# Patient Record
Sex: FEMALE | Race: BLACK OR AFRICAN AMERICAN | Employment: OTHER | URBAN - METROPOLITAN AREA
[De-identification: names, ages, dates, MRNs, and addresses within clinical notes are randomized per-mention and may not be internally consistent; named-entity substitution may affect disease eponyms.]

---

## 2017-01-16 ENCOUNTER — OFFICE VISIT (OUTPATIENT)
Dept: INTERNAL MEDICINE CLINIC | Age: 62
End: 2017-01-16

## 2017-01-16 VITALS
SYSTOLIC BLOOD PRESSURE: 109 MMHG | WEIGHT: 162.2 LBS | OXYGEN SATURATION: 99 % | HEART RATE: 83 BPM | BODY MASS INDEX: 27.69 KG/M2 | TEMPERATURE: 97.6 F | DIASTOLIC BLOOD PRESSURE: 84 MMHG | RESPIRATION RATE: 20 BRPM | HEIGHT: 64 IN

## 2017-01-16 DIAGNOSIS — I10 ESSENTIAL HYPERTENSION: ICD-10-CM

## 2017-01-16 DIAGNOSIS — Z23 ENCOUNTER FOR IMMUNIZATION: ICD-10-CM

## 2017-01-16 DIAGNOSIS — J45.20 ASTHMA, MILD INTERMITTENT, WELL-CONTROLLED: ICD-10-CM

## 2017-01-16 DIAGNOSIS — Z12.31 ENCOUNTER FOR SCREENING MAMMOGRAM FOR BREAST CANCER: ICD-10-CM

## 2017-01-16 DIAGNOSIS — J06.9 URTI (ACUTE UPPER RESPIRATORY INFECTION): Primary | ICD-10-CM

## 2017-01-16 DIAGNOSIS — E11.9 TYPE 2 DIABETES MELLITUS WITHOUT COMPLICATION, WITHOUT LONG-TERM CURRENT USE OF INSULIN (HCC): ICD-10-CM

## 2017-01-16 RX ORDER — AZITHROMYCIN 250 MG/1
TABLET, FILM COATED ORAL
Qty: 6 TAB | Refills: 0 | Status: SHIPPED | OUTPATIENT
Start: 2017-01-16 | End: 2017-05-17 | Stop reason: ALTCHOICE

## 2017-01-16 NOTE — LETTER
1/18/2017 10:10 AM 
 
Ms. Colette Santos [address] [phone] Dear Colette Santos: Please find your most recent results below. Resulted Orders METABOLIC PANEL, COMPREHENSIVE Result Value Ref Range Glucose 125 (H) 65 - 99 mg/dL BUN 14 8 - 27 mg/dL Creatinine 0.98 0.57 - 1.00 mg/dL GFR est non-AA 62 >59 mL/min/1.73 GFR est AA 71 >59 mL/min/1.73  
 BUN/Creatinine ratio 14 11 - 26 Sodium 142 134 - 144 mmol/L Potassium 4.2 3.5 - 5.2 mmol/L Chloride 99 96 - 106 mmol/L  
 CO2 26 18 - 29 mmol/L Calcium 9.6 8.7 - 10.3 mg/dL Protein, total 7.9 6.0 - 8.5 g/dL Albumin 4.6 3.6 - 4.8 g/dL GLOBULIN, TOTAL 3.3 1.5 - 4.5 g/dL A-G Ratio 1.4 1.1 - 2.5 Bilirubin, total 0.3 0.0 - 1.2 mg/dL Alk. phosphatase 48 39 - 117 IU/L  
 AST 19 0 - 40 IU/L  
 ALT 18 0 - 32 IU/L Narrative Performed at:  [address]  [id number] : Oscar Puente MD, Phone:  [phone] HEMOGLOBIN A1C WITH EAG Result Value Ref Range Hemoglobin A1c 6.8 (H) 4.8 - 5.6 % Comment:  
            Pre-diabetes: 5.7 - 6.4 Diabetes: >6.4 Glycemic control for adults with diabetes: <7.0 Estimated average glucose 148 mg/dL Narrative Performed at:  [address]  [id number] : Oscar Puente MD, Phone:  [phone] MICROALBUMIN, UR, RAND W/ MICROALBUMIN/CREA RATIO Result Value Ref Range Creatinine, urine 198.9 Not Estab. mg/dL Microalbumin, urine 28.2 Not Estab. ug/mL Microalb/Creat ratio (ug/mg creat.) 14.2 0.0 - 30.0 mg/g creat Narrative Performed at:  [facility] [address]  [id number] : Oscar Puente MD, Phone:  [phone] RECOMMENDATIONS: 
 
 
  Stable Please call me if you have any questions: [phone] Sincerely, Mehdi Mcfadden MD

## 2017-01-16 NOTE — MR AVS SNAPSHOT
Visit Information Date & Time Provider Department Dept. Phone Encounter #  
 1/16/2017  8:45 AM Gus Langston, [facility] [id number] Upcoming Health Maintenance Date Due Hepatitis C Screening 1955 Pneumococcal 19-64 Medium Risk (1 of 1 - PPSV23) 8/19/1973 DTaP/Tdap/Td series (1 - Tdap) 8/19/1975 ZOSTER VACCINE AGE 60> 8/19/2014 EYE EXAM RETINAL OR DILATED Q1 3/17/2016 FOOT EXAM Q1 6/23/2016 PAP AKA CERVICAL CYTOLOGY 6/26/2016 HEMOGLOBIN A1C Q6M 3/19/2017 MICROALBUMIN Q1 6/24/2017 LIPID PANEL Q1 6/24/2017 BREAST CANCER SCRN MAMMOGRAM 12/28/2017 COLONOSCOPY 1/3/2026 Allergies as of 1/16/2017  Review Complete On: 1/16/2017 By: Gus Langston MD  
  
 Severity Noted Reaction Type Reactions Seafood High 09/19/2016    Anaphylaxis Current Immunizations  Reviewed on 1/16/2017 No immunizations on file. Reviewed by Gus Langston MD on 1/16/2017 at  9:05 AM  
You Were Diagnosed With   
  
 Codes Comments URTI (acute upper respiratory infection)    -  Primary ICD-10-CM: J06.9 ICD-9-CM: 465.9 Asthma, mild intermittent, well-controlled     ICD-10-CM: J45.20 ICD-9-CM: 493.90 Type 2 diabetes mellitus without complication, without long-term current use of insulin (HCC)     ICD-10-CM: E11.9 ICD-9-CM: 250.00 Essential hypertension     ICD-10-CM: I10 
ICD-9-CM: 401.9 Encounter for screening mammogram for breast cancer     ICD-10-CM: Z12.31 
ICD-9-CM: V76.12 Encounter for immunization     ICD-10-CM: M93 ICD-9-CM: V03.89 Vitals BP Pulse Temp Resp Height(growth percentile) Weight(growth percentile) 109/84 (BP 1 Location: Right arm, BP Patient Position: Sitting) 83 97.6 °F (36.4 °C) (Oral) 20 5' 4\" (1.626 m) 162 lb 3.2 oz (73.6 kg) SpO2 BMI OB Status Smoking Status 99% 27.84 kg/m2 Postmenopausal Former Smoker Vitals History BMI and BSA Data Body Mass Index Body Surface Area  
 27.84 kg/m 2 1.82 m 2 Preferred Pharmacy Pharmacy Name Phone [facility] [address]  [facility] [phone] Your Updated Medication List  
  
   
This list is accurate as of: 1/16/17  9:11 AM.  Always use your most recent med list.  
  
  
  
  
 * albuterol 90 mcg/actuation inhaler Commonly known as:  PROAIR HFA INHALE 2 PUFFS ORALLY EVERY SIX HOURS AS NEEDED FOR WHEEZING  
  
 * albuterol 2.5 mg /3 mL (0.083 %) nebulizer solution Commonly known as:  PROVENTIL VENTOLIN  
3 mL by Nebulization route three (3) times daily as needed for Wheezing. azithromycin 250 mg tablet Commonly known as:  Abigail Livingston Take two tablets today then one tablet daily Blood-Glucose Meter monitoring kit Check sugar QD  
  
 fluticasone-salmeterol 250-50 mcg/dose diskus inhaler Commonly known as:  ADVAIR DISKUS Take 1 Puff by inhalation two (2) times a day. glucose blood VI test strips strip Commonly known as:  ONETOUCH ULTRA TEST Check BS QD Lancets Misc Check sugar QD  
  
 lisinopril-hydroCHLOROthiazide 20-25 mg per tablet Commonly known as:  PRINZIDE, ZESTORETIC  
TAKE 1 TABLET BY MOUTH EVERY DAY  
  
 metFORMIN 500 mg tablet Commonly known as:  GLUCOPHAGE  
TAKE 1 TABLET BY MOUTH TWICE DAILY WITH MEALS  
  
 mometasone 0.1 % ointment Commonly known as:  Estelle Croon Apply  to affected area daily. montelukast 10 mg tablet Commonly known as:  SINGULAIR Take 1 Tab by mouth daily. Nebulizer & Compressor machine Use TID PRN for wheezing Nebulizer Accessories Kit  
by Does Not Apply route. USE PRN WHEEZING  
  
 pneumococcal 13 yolande conj dip 0.5 mL Syrg injection Commonly known as:  PREVNAR-13  
0.5 mL by IntraMUSCular route once for 1 dose. * Notice:   This list has 2 medication(s) that are the same as other medications prescribed for you. Read the directions carefully, and ask your doctor or other care provider to review them with you. Prescriptions Sent to Pharmacy Refills  
 pneumococcal 13 yolande conj dip (PREVNAR-13) 0.5 mL syrg injection 0 Si.5 mL by IntraMUSCular route once for 1 dose. Class: Normal  
 Pharmacy: [facility] [address] Ph #: [phone] Route: IntraMUSCular  
 azithromycin (ZITHROMAX Z-DEQUAN) 250 mg tablet 0 Sig: Take two tablets today then one tablet daily Class: Normal  
 Pharmacy: [facility] [address] Ph #: [phone] We Performed the Following HEMOGLOBIN A1C WITH EAG [02247 CPT(R)] METABOLIC PANEL, COMPREHENSIVE [08478 CPT(R)] MICROALBUMIN, UR, RAND W/ MICROALBUMIN/CREA RATIO [id number] CPT(R)] To-Do List   
 2017 Imaging:  [facility] MAMMO BI SCREENING INCL CAD Patient Instructions Upper Respiratory Infection (Cold): Care Instructions Your Care Instructions An upper respiratory infection, or URI, is an infection of the nose, sinuses, or throat. URIs are spread by coughs, sneezes, and direct contact. The common cold is the most frequent kind of URI. The flu and sinus infections are other kinds of URIs. Almost all URIs are caused by viruses. Antibiotics won't cure them. But you can treat most infections with home care. This may include drinking lots of fluids and taking over-the-counter pain medicine. You will probably feel better in 4 to 10 days. The doctor has checked you carefully, but problems can develop later. If you notice any problems or new symptoms, get medical treatment right away. Follow-up care is a key part of your treatment and safety.  Be sure to make and go to all appointments, and call your doctor if you are having problems. It's also a good idea to know your test results and keep a list of the medicines you take. How can you care for yourself at home? · To prevent dehydration, drink plenty of fluids, enough so that your urine is light yellow or clear like water. Choose water and other caffeine-free clear liquids until you feel better. If you have kidney, heart, or liver disease and have to limit fluids, talk with your doctor before you increase the amount of fluids you drink. · Take an over-the-counter pain medicine, such as acetaminophen (Tylenol), ibuprofen (Advil, Motrin), or naproxen (Aleve). Read and follow all instructions on the label. · Before you use cough and cold medicines, check the label. These medicines may not be safe for young children or for people with certain health problems. · Be careful when taking over-the-counter cold or flu medicines and Tylenol at the same time. Many of these medicines have acetaminophen, which is Tylenol. Read the labels to make sure that you are not taking more than the recommended dose. Too much acetaminophen (Tylenol) can be harmful. · Get plenty of rest. 
· Do not smoke or allow others to smoke around you. If you need help quitting, talk to your doctor about stop-smoking programs and medicines. These can increase your chances of quitting for good. When should you call for help? Call 911 anytime you think you may need emergency care. For example, call if: 
· You have severe trouble breathing. Call your doctor now or seek immediate medical care if: 
· You seem to be getting much sicker. · You have new or worse trouble breathing. · You have a new or higher fever. · You have a new rash. Watch closely for changes in your health, and be sure to contact your doctor if: 
· You have a new symptom, such as a sore throat, an earache, or sinus pain. · You cough more deeply or more often, especially if you notice more mucus or a change in the color of your mucus. · You do not get better as expected. Where can you learn more? Go to http://sol-hardik.info/. Enter B267 in the search box to learn more about \"Upper Respiratory Infection (Cold): Care Instructions. \" Current as of: June 30, 2016 Content Version: 11.1 © 4587-2087 [employer]. Care instructions adapted under license by [employer] (which disclaims liability or warranty for this information). If you have questions about a medical condition or this instruction, always ask your healthcare professional. [address] any warranty or liability for your use of this information. Introducing [facility]! Dear Yaz Griffin: Thank you for requesting a [employer] account. Our records indicate that you already have an active [employer] account. You can access your account anytime at [email or web address] Did you know that you can access your hospital and ER discharge instructions at any time in [employer]? You can also review all of your test results from your hospital stay or ER visit. Additional Information If you have questions, please visit the Frequently Asked Questions section of the [employer] website at [email or web address] Remember, [employer] is NOT to be used for urgent needs. For medical emergencies, dial 911. Now available from your iPhone and Android! Please provide this summary of care documentation to your next provider. Your primary care clinician is listed as Dunia Crocker. If you have any questions after today's visit, please call [phone].

## 2017-01-16 NOTE — PROGRESS NOTES
Health Maintenance Due   Topic Date Due    Hepatitis C Screening  1955    Pneumococcal 19-64 Medium Risk (1 of 1 - PPSV23) 08/19/1973    DTaP/Tdap/Td series (1 - Tdap) 08/19/1975    ZOSTER VACCINE AGE 60>  08/19/2014    EYE EXAM RETINAL OR DILATED Q1  03/17/2016    FOOT EXAM Q1  06/23/2016    PAP AKA CERVICAL CYTOLOGY  06/26/2016       Chief Complaint   Patient presents with    Follow-up     4 month    Hypertension    Cholesterol Problem    Diabetes    Cold Symptoms     cough and nasal congestion       1. Have you been to the ER, urgent care clinic since your last visit? Hospitalized since your last visit? No    2. Have you seen or consulted any other health care providers outside of the [address] [place] [address] since your last visit? Include any pap smears or colon screening. No    3) Do you have an Advance Directive on file? no    4) Are you interested in receiving information on Advance Directives? NO      Patient is accompanied by self I have received verbal consent from Jagjit Baldwin to discuss any/all medical information while they are present in the room.

## 2017-01-16 NOTE — PATIENT INSTRUCTIONS

## 2017-01-16 NOTE — PROGRESS NOTES
HISTORY OF PRESENT ILLNESS  Alba Bateman is a 57 y.o. female here for follow up. She has been coughing for past 1 week.have yellow sputum. no wheezing. has asthma.using inhaler. She is seen for diabetes. He reports medication compliance: compliant most of the time. Diabetic diet compliance: compliant most of the time. Home glucose monitoring: is not performed. Further diabetic ROS: no polyuria or polydipsia, no chest pain, dyspnea or TIA's, no numbness, tingling or pain in extremities, no hypoglycemia. Lab review: labs reviewed and discussed with patient. Eye exam: up to date. Labs reviewed. Have HTN,on meds. Need mammogram and pneumonia shot. Follow-up   Pertinent negatives include no chest pain. Hypertension    Pertinent negatives include no chest pain, no palpitations, no blurred vision, no neck pain and no nausea. Cholesterol Problem   Pertinent negatives include no chest pain. Diabetes   Pertinent negatives include no chest pain. Cold Symptoms   Pertinent negatives include no chest pain, no chills, no myalgias and no nausea. Asthma   Pertinent negatives include no chest pain. Review of Systems   Constitutional: Negative. Negative for chills and fever. HENT: Negative. Eyes: Negative. Negative for blurred vision and double vision. Respiratory: Positive for cough and sputum production. Cardiovascular: Negative. Negative for chest pain and palpitations. Gastrointestinal: Negative. Negative for heartburn and nausea. Genitourinary: Negative. Negative for dysuria and urgency. Musculoskeletal: Negative. Negative for back pain, myalgias and neck pain. Skin: Negative. Negative for itching and rash. Psychiatric/Behavioral: Negative. Physical Exam   Constitutional: She appears well-developed and well-nourished. No distress. HENT:   Head: Normocephalic and atraumatic.    Right Ear: External ear normal.   Left Ear: External ear normal.   Mouth/Throat: Oropharynx is clear and moist. No oropharyngeal exudate. Nasal turbinates:red inflamed,NT    Cobble stoning present. Neck: Normal range of motion. Neck supple. No JVD present. No thyromegaly present. Cardiovascular: Normal rate, regular rhythm, normal heart sounds and intact distal pulses. Pulmonary/Chest: Effort normal and breath sounds normal. No respiratory distress. She has no wheezes. Abdominal: Soft. Bowel sounds are normal. She exhibits no distension. There is no tenderness. Musculoskeletal: She exhibits no edema or tenderness. Psychiatric: She has a normal mood and affect. Her behavior is normal.   anxious       ASSESSMENT and PLAN  Alba was seen today for follow-up, hypertension, cholesterol problem, diabetes and cold symptoms. Diagnoses and all orders for this visit:    URTI (acute upper respiratory infection)    Rest and fluid. Will call in,  -     azithromycin (ZITHROMAX Z-DEQUAN) 250 mg tablet; Take two tablets today then one tablet daily    Asthma, mild intermittent, well-controlled    Type 2 diabetes mellitus without complication, without long-term current use of insulin ([facility])  -     METABOLIC PANEL, COMPREHENSIVE  -     HEMOGLOBIN A1C WITH EAG  -     MICROALBUMIN, UR, RAND W/ MICROALBUMIN/CREA RATIO    Essential hypertension  -     METABOLIC PANEL, COMPREHENSIVE    Encounter for screening mammogram for breast cancer  -     [facility] MAMMO BI SCREENING INCL CAD; Future    Encounter for immunization  -     pneumococcal 13 yolande conj dip (PREVNAR-13) 0.5 mL syrg injection; 0.5 mL by IntraMUSCular route once for 1 dose. Discussed expected course/resolution/complications of diagnosis in detail with patient. Medication risks/benefits/costs/interactions/alternatives discussed with patient. Pt was given an after visit summary which includes diagnoses, current medications & vitals. Pt expressed understanding with the diagnosis and plan.

## 2017-01-17 LAB
ALBUMIN SERPL-MCNC: 4.6 G/DL (ref 3.6–4.8)
ALBUMIN/CREAT UR: 14.2 MG/G CREAT (ref 0–30)
ALBUMIN/GLOB SERPL: 1.4 {RATIO} (ref 1.1–2.5)
ALP SERPL-CCNC: 48 IU/L (ref 39–117)
ALT SERPL-CCNC: 18 IU/L (ref 0–32)
AST SERPL-CCNC: 19 IU/L (ref 0–40)
BILIRUB SERPL-MCNC: 0.3 MG/DL (ref 0–1.2)
BUN SERPL-MCNC: 14 MG/DL (ref 8–27)
BUN/CREAT SERPL: 14 (ref 11–26)
CALCIUM SERPL-MCNC: 9.6 MG/DL (ref 8.7–10.3)
CHLORIDE SERPL-SCNC: 99 MMOL/L (ref 96–106)
CO2 SERPL-SCNC: 26 MMOL/L (ref 18–29)
CREAT SERPL-MCNC: 0.98 MG/DL (ref 0.57–1)
CREAT UR-MCNC: 198.9 MG/DL
EST. AVERAGE GLUCOSE BLD GHB EST-MCNC: 148 MG/DL
GLOBULIN SER CALC-MCNC: 3.3 G/DL (ref 1.5–4.5)
GLUCOSE SERPL-MCNC: 125 MG/DL (ref 65–99)
HBA1C MFR BLD: 6.8 % (ref 4.8–5.6)
MICROALBUMIN UR-MCNC: 28.2 UG/ML
POTASSIUM SERPL-SCNC: 4.2 MMOL/L (ref 3.5–5.2)
PROT SERPL-MCNC: 7.9 G/DL (ref 6–8.5)
SODIUM SERPL-SCNC: 142 MMOL/L (ref 134–144)

## 2017-02-03 NOTE — TELEPHONE ENCOUNTER
Wants to have a refill for one touch ultra test strips. Has not had the script since 2014 that was last fill.  Please niya to [facility] on file

## 2017-05-17 ENCOUNTER — OFFICE VISIT (OUTPATIENT)
Dept: INTERNAL MEDICINE CLINIC | Age: 62
End: 2017-05-17

## 2017-05-17 VITALS
TEMPERATURE: 98 F | HEIGHT: 64 IN | OXYGEN SATURATION: 97 % | DIASTOLIC BLOOD PRESSURE: 80 MMHG | SYSTOLIC BLOOD PRESSURE: 140 MMHG | WEIGHT: 170 LBS | BODY MASS INDEX: 29.02 KG/M2 | RESPIRATION RATE: 20 BRPM | HEART RATE: 92 BPM

## 2017-05-17 DIAGNOSIS — E78.2 MIXED HYPERLIPIDEMIA: ICD-10-CM

## 2017-05-17 DIAGNOSIS — I10 ESSENTIAL HYPERTENSION: ICD-10-CM

## 2017-05-17 DIAGNOSIS — J45.20 ASTHMA, MILD INTERMITTENT, WELL-CONTROLLED: ICD-10-CM

## 2017-05-17 DIAGNOSIS — J30.1 ALLERGIC RHINITIS DUE TO POLLEN, UNSPECIFIED RHINITIS SEASONALITY: ICD-10-CM

## 2017-05-17 DIAGNOSIS — E11.9 TYPE 2 DIABETES MELLITUS WITHOUT COMPLICATION, WITHOUT LONG-TERM CURRENT USE OF INSULIN (HCC): Primary | ICD-10-CM

## 2017-05-17 NOTE — MR AVS SNAPSHOT
Visit Information Date & Time Provider Department Dept. Phone Encounter #  
 5/17/2017  8:45 AM Carmen Nguyen, [address] [facility] [phone] [id number] Upcoming Health Maintenance Date Due Hepatitis C Screening 1955 Pneumococcal 19-64 Medium Risk (1 of 1 - PPSV23) 8/19/1973 DTaP/Tdap/Td series (1 - Tdap) 8/19/1975 ZOSTER VACCINE AGE 60> 8/19/2014 EYE EXAM RETINAL OR DILATED Q1 3/17/2016 PAP AKA CERVICAL CYTOLOGY 6/26/2016 LIPID PANEL Q1 6/24/2017 HEMOGLOBIN A1C Q6M 7/16/2017 INFLUENZA AGE 9 TO ADULT 8/1/2017 BREAST CANCER SCRN MAMMOGRAM 12/28/2017 MICROALBUMIN Q1 1/16/2018 FOOT EXAM Q1 5/17/2018 COLONOSCOPY 1/3/2026 Allergies as of 5/17/2017  Review Complete On: 5/17/2017 By: Carmen Nguyen MD  
  
 Severity Noted Reaction Type Reactions Seafood High 09/19/2016    Anaphylaxis Current Immunizations  Reviewed on 1/16/2017 No immunizations on file. Not reviewed this visit You Were Diagnosed With   
  
 Codes Comments Type 2 diabetes mellitus without complication, without long-term current use of insulin (HCC)    -  Primary ICD-10-CM: E11.9 ICD-9-CM: 250.00 Essential hypertension     ICD-10-CM: I10 
ICD-9-CM: 401.9 Mixed hyperlipidemia     ICD-10-CM: E78.2 ICD-9-CM: 272.2 Asthma, mild intermittent, well-controlled     ICD-10-CM: J45.20 ICD-9-CM: 493.90 Allergic rhinitis due to pollen, unspecified rhinitis seasonality     ICD-10-CM: J30.1 ICD-9-CM: 477.0 Vitals BP Pulse Temp Resp Height(growth percentile) Weight(growth percentile) 140/80 92 98 °F (36.7 °C) (Oral) 20 5' 4\" (1.626 m) 170 lb (77.1 kg) SpO2 BMI OB Status Smoking Status 97% 29.18 kg/m2 Postmenopausal Former Smoker Vitals History BMI and BSA Data Body Mass Index Body Surface Area  
 29.18 kg/m 2 1.87 m 2 Preferred Pharmacy Pharmacy Name Phone [facility] [address]  [place] [phone] Your Updated Medication List  
  
   
This list is accurate as of: 5/17/17  9:18 AM.  Always use your most recent med list.  
  
  
  
  
 * albuterol 90 mcg/actuation inhaler Commonly known as:  PROAIR HFA INHALE 2 PUFFS ORALLY EVERY SIX HOURS AS NEEDED FOR WHEEZING  
  
 * albuterol 2.5 mg /3 mL (0.083 %) nebulizer solution Commonly known as:  PROVENTIL VENTOLIN  
3 mL by Nebulization route three (3) times daily as needed for Wheezing. Blood-Glucose Meter monitoring kit Check sugar QD  
  
 fluticasone-salmeterol 250-50 mcg/dose diskus inhaler Commonly known as:  ADVAIR DISKUS Take 1 Puff by inhalation two (2) times a day. * glucose blood VI test strips strip Commonly known as:  ONETOUCH ULTRA TEST Check BS QD  
  
 * glucose blood VI test strips strip Commonly known as:  ASCENSIA AUTODISC VI, ONE TOUCH ULTRA TEST VI To test blood glucose daily Lancets Misc Check sugar QD  
  
 lisinopril-hydroCHLOROthiazide 20-25 mg per tablet Commonly known as:  PRINZIDE, ZESTORETIC  
TAKE 1 TABLET BY MOUTH EVERY DAY  
  
 metFORMIN 500 mg tablet Commonly known as:  GLUCOPHAGE  
TAKE 1 TABLET BY MOUTH TWICE DAILY WITH MEALS  
  
 mometasone 0.1 % ointment Commonly known as:  Saddie Satchel Apply  to affected area daily. montelukast 10 mg tablet Commonly known as:  SINGULAIR Take 1 Tab by mouth daily. Nebulizer & Compressor machine Use TID PRN for wheezing Nebulizer Accessories Kit  
by Does Not Apply route. USE PRN WHEEZING  
  
 * Notice: This list has 4 medication(s) that are the same as other medications prescribed for you. Read the directions carefully, and ask your doctor or other care provider to review them with you. We Performed the Following ALLERGEN (24) PANEL [[id number] Custom] FOOD ALLERGY PROFILE [53941 CPT(R)] HEMOGLOBIN A1C WITH EAG [11513 CPT(R)] LIPID PANEL [90590 CPT(R)] METABOLIC PANEL, COMPREHENSIVE [41173 CPT(R)] Patient Instructions Learning About Diabetes Food Guidelines Your Care Instructions Meal planning is important to manage diabetes. It helps keep your blood sugar at a target level (which you set with your doctor). You don't have to eat special foods. You can eat what your family eats, including sweets once in a while. But you do have to pay attention to how often you eat and how much you eat of certain foods. You may want to work with a dietitian or a certified diabetes educator (CDE) to help you plan meals and snacks. A dietitian or CDE can also help you lose weight if that is one of your goals. What should you know about eating carbs? Managing the amount of carbohydrate (carbs) you eat is an important part of healthy meals when you have diabetes. Carbohydrate is found in many foods. · Learn which foods have carbs. And learn the amounts of carbs in different foods. ¨ Bread, cereal, pasta, and rice have about 15 grams of carbs in a serving. A serving is 1 slice of bread (1 ounce), ½ cup of cooked cereal, or 1/3 cup of cooked pasta or rice. ¨ Fruits have 15 grams of carbs in a serving. A serving is 1 small fresh fruit, such as an apple or orange; ½ of a banana; ½ cup of cooked or canned fruit; ½ cup of fruit juice; 1 cup of melon or raspberries; or 2 tablespoons of dried fruit. ¨ Milk and no-sugar-added yogurt have 15 grams of carbs in a serving. A serving is 1 cup of milk or 2/3 cup of no-sugar-added yogurt. ¨ Starchy vegetables have 15 grams of carbs in a serving. A serving is ½ cup of mashed potatoes or sweet potato; 1 cup winter squash; ½ of a small baked potato; ½ cup of cooked beans; or ½ cup cooked corn or green peas.  
· Learn how much carbs to eat each day and at each meal. A dietitian or CDE can teach you how to keep track of the amount of carbs you eat. This is called carbohydrate counting. · If you are not sure how to count carbohydrate grams, use the Plate Method to plan meals. It is a good, quick way to make sure that you have a balanced meal. It also helps you spread carbs throughout the day. ¨ Divide your plate by types of foods. Put non-starchy vegetables on half the plate, meat or other protein food on one-quarter of the plate, and a grain or starchy vegetable in the final quarter of the plate. To this you can add a small piece of fruit and 1 cup of milk or yogurt, depending on how many carbs you are supposed to eat at a meal. 
· Try to eat about the same amount of carbs at each meal. Do not \"save up\" your daily allowance of carbs to eat at one meal. 
· Proteins have very little or no carbs per serving. Examples of proteins are beef, chicken, turkey, fish, eggs, tofu, cheese, cottage cheese, and peanut butter. A serving size of meat is 3 ounces, which is about the size of a deck of cards. Examples of meat substitute serving sizes (equal to 1 ounce of meat) are 1/4 cup of cottage cheese, 1 egg, 1 tablespoon of peanut butter, and ½ cup of tofu. How can you eat out and still eat healthy? · Learn to estimate the serving sizes of foods that have carbohydrate. If you measure food at home, it will be easier to estimate the amount in a serving of restaurant food. · If the meal you order has too much carbohydrate (such as potatoes, corn, or baked beans), ask to have a low-carbohydrate food instead. Ask for a salad or green vegetables. · If you use insulin, check your blood sugar before and after eating out to help you plan how much to eat in the future. · If you eat more carbohydrate at a meal than you had planned, take a walk or do other exercise. This will help lower your blood sugar. What else should you know? · Limit saturated fat, such as the fat from meat and dairy products.  This is a healthy choice because people who have diabetes are at higher risk of heart disease. So choose lean cuts of meat and nonfat or low-fat dairy products. Use olive or canola oil instead of butter or shortening when cooking. · Don't skip meals. Your blood sugar may drop too low if you skip meals and take insulin or certain medicines for diabetes. · Check with your doctor before you drink alcohol. Alcohol can cause your blood sugar to drop too low. Alcohol can also cause a bad reaction if you take certain diabetes medicines. Follow-up care is a key part of your treatment and safety. Be sure to make and go to all appointments, and call your doctor if you are having problems. It's also a good idea to know your test results and keep a list of the medicines you take. Where can you learn more? Go to http://sol-hardik.info/. Enter I083 in the search box to learn more about \"Learning About Diabetes Food Guidelines. \" Current as of: May 23, 2016 Content Version: 11.2 © 0102-4178 [facility]. Care instructions adapted under license by [employer] (which disclaims liability or warranty for this information). If you have questions about a medical condition or this instruction, always ask your healthcare professional. [address] any warranty or liability for your use of this information. Introducing [facility]! Dear Levi Lechuga: Thank you for requesting a Alai account. Our records indicate that you already have an active Alai account. You can access your account anytime at [email or web address] Did you know that you can access your hospital and ER discharge instructions at any time in Alai? You can also review all of your test results from your hospital stay or ER visit. Additional Information If you have questions, please visit the Frequently Asked Questions section of the [employer] website at [email or web address] Remember, BarEye is NOT to be used for urgent needs. For medical emergencies, dial 911. Now available from your iPhone and Android! Please provide this summary of care documentation to your next provider. Your primary care clinician is listed as [facility]. If you have any questions after today's visit, please call [phone].

## 2017-05-17 NOTE — LETTER
6/1/2017 2:52 PM 
 
Ms. Mitzi Castro [address] [id number] Dear Mitzi Castro: Please find your most recent results below. Resulted Orders METABOLIC PANEL, COMPREHENSIVE Result Value Ref Range Glucose 168 (H) 65 - 99 mg/dL BUN 13 8 - 27 mg/dL Creatinine 0.94 0.57 - 1.00 mg/dL GFR est non-AA 65 >59 mL/min/1.73 GFR est AA 75 >59 mL/min/1.73  
 BUN/Creatinine ratio 14 12 - 28 Sodium 142 134 - 144 mmol/L Potassium 4.7 3.5 - 5.2 mmol/L Chloride 100 96 - 106 mmol/L  
 CO2 25 18 - 29 mmol/L Calcium 10.2 8.7 - 10.3 mg/dL Protein, total 7.6 6.0 - 8.5 g/dL Albumin 4.8 3.6 - 4.8 g/dL GLOBULIN, TOTAL 2.8 1.5 - 4.5 g/dL A-G Ratio 1.7 1.2 - 2.2 Bilirubin, total 0.4 0.0 - 1.2 mg/dL Alk. phosphatase 49 39 - 117 IU/L  
 AST (SGOT) 20 0 - 40 IU/L  
 ALT (SGPT) 21 0 - 32 IU/L Narrative Performed at:  [address]  [id number] : Sabrina Norris MD, Phone:  [phone] HEMOGLOBIN A1C WITH EAG Result Value Ref Range Hemoglobin A1c 7.0 (H) 4.8 - 5.6 % Comment:  
            Pre-diabetes: 5.7 - 6.4 Diabetes: >6.4 Glycemic control for adults with diabetes: <7.0 Estimated average glucose 154 mg/dL Narrative Performed at:  [facility] [address] [place] [address]  [id number] : Sabrina Norris MD, Phone:  [phone] LIPID PANEL Result Value Ref Range Cholesterol, total 199 100 - 199 mg/dL Triglyceride 110 0 - 149 mg/dL HDL Cholesterol 57 >39 mg/dL VLDL, calculated 22 5 - 40 mg/dL LDL, calculated 120 (H) 0 - 99 mg/dL Narrative Performed at:  [address] [address] [facility] [address] [place] [address]  [id number] : Sabrina Norris MD, Phone:  [phone] FOOD ALLERGY PROFILE Result Value Ref Range CLASS DESCRIPTION Comment Comment: Levels of Specific IgE       Class  Description of Class 
    ---------------------------  -----  -------------------- 
                   < 0.10         0         Negative 0.10 -    0.31         0/I       Equivocal/Low 
           0.32 -    0.55         I         Low 
           0.56 -    1.40         II        Moderate 1.41 -    3.90         III       High 
           3.91 -   19.00         IV        Very High 
          19.01 -  100.00         V         Very High 
                  >100.00         VI        Very High Egg White 0.17 (A) Class 0/I kU/L Peanut <0.10 Class 0 kU/L Soybean 1.39 (A) Class II kU/L Milk (Cow) 0.20 (A) Class 0/I kU/L Clam 2.29 (A) Class III kU/L Shrimp 8.06 (A) Class IV kU/L Walnuts, IgE <0.10 Class 0 kU/L Codfish <0.10 Class 0 kU/L Scallops 1.78 (A) Class III kU/L Wheat <0.10 Class 0 kU/L Corn <0.10 Class 0 kU/L Sesame Seed <0.10 Class 0 kU/L Narrative Performed at:  [address]  [id number] : Marisel Smith MD, Phone:  [phone] ALLERGEN (24) PANEL Result Value Ref Range CLASS DESCRIPTION Comment Comment:  
       Levels of Specific IgE       Class  Description of Class 
    ---------------------------  -----  -------------------- 
                   < 0.10         0         Negative 0.10 -    0.31         0/I       Equivocal/Low 
           0.32 -    0.55         I         Low 
           0.56 -    1.40         II        Moderate 1.41 -    3.90         III       High 
           3.91 -   19.00         IV        Very High 
          19.01 -  100.00         V         Very High 
                  >100.00         VI        Very High Immunoglobulin E 625 (H) 0 - 100 IU/mL D. pteronyssinus 33.60 (A) Class V kU/L  
 D. farinae Mite 26.80 (A) Class V kU/L  Cat Hair/Dander 95.20 (A) Class V kU/L  
 Dog Hair/Dander 42.60 (A) Class V kU/L Bermuda Grass 0.12 (A) Class 0/I kU/L Bravo grass 5.49 (A) Class IV kU/L Chapo Grass 0.84 (A) Class II kU/L Cockroach, Hebrew 2.52 (A) Class III kU/L Penicillium notatum <0.10 Class 0 kU/L Cladosporium herbarum <0.10 Class 0 kU/L Aspergillus fumigatus <0.10 Class 0 kU/L Alternaria tenuis <0.10 Class 0 kU/L Maple/Pawnee 0.24 (A) Class 0/I kU/L  
 U651-EVY COMMON SILVER BIRCH <0.10 Class 0 kU/L [facility] 0.76 (A) Class II kU/L [place] <0.10 Class 0 kU/L [employer] <0.10 Class 0 kU/L Cochran Tree <0.10 Class 0 kU/L Pecan Tree <0.10 Class 0 kU/L White [place] <0.10 Class 0 kU/L Ragweed, Short/Common 1.49 (A) Class III kU/L Pigweed, Rough <0.10 Class 0 kU/L Sheep [place] [facility]) 0.21 (A) Class 0/I kU/L Mouse urine 1.20 (A) Class II kU/L Narrative Performed at:  [address]  [id number] : Umesh Lerner MD, Phone:  [phone] CVD REPORT Result Value Ref Range INTERPRETATION Note Comment:  
   Supplement report is available. Narrative Performed at:  [facility] [place] [address]  [id number] : Rosaura Bey PhD, Phone:  [phone] DIABETES PATIENT EDUCATION Result Value Ref Range PDF Image Not applicable Narrative Performed at:  [address] [place] [address]  [id number] : Rosaura Bey PhD, Phone:  [phone] RECOMMENDATIONS: 
Myles Bernheim as per your conversation with my nurse, you have multiple allergies and please call Dr Areli Valles an Allergist, and make an appointment. All other labs are stable Please call me if you have any questions: [phone] Sincerely, Alcides Gandhi MD

## 2017-05-17 NOTE — PROGRESS NOTES
HISTORY OF PRESENT ILLNESS  [place] I Devra Schilder is a 57 y.o. female here for follow up. She is upset that she has gained weight. doign lot of weight lifting. watching diet. has asthma.using inhaler. Allergy is worse. lot of post nasal drip. She is seen for diabetes. He reports medication compliance: compliant most of the time. Diabetic diet compliance: compliant most of the time. Home glucose monitoring: is not performed. Further diabetic ROS: no polyuria or polydipsia, no chest pain, dyspnea or TIA's, no numbness, tingling or pain in extremities, no hypoglycemia. Lab review: labs reviewed and discussed with patient. Eye exam: up to date. Labs reviewed. Have HTN,on meds. Need mammogram and pneumonia shot. Hypertension    Pertinent negatives include no chest pain, no palpitations, no blurred vision, no neck pain and no nausea. Diabetes   Pertinent negatives include no chest pain. Cholesterol Problem   Pertinent negatives include no chest pain. Follow-up   Pertinent negatives include no chest pain. Cold Symptoms   Pertinent negatives include no chest pain, no chills, no myalgias and no nausea. Asthma   Pertinent negatives include no chest pain. Review of Systems   Constitutional: Negative. Negative for chills and fever. HENT: Positive for congestion. Eyes: Negative. Negative for blurred vision and double vision. Respiratory: Positive for cough. Cardiovascular: Negative. Negative for chest pain and palpitations. Gastrointestinal: Negative. Negative for heartburn and nausea. Genitourinary: Negative. Negative for dysuria and urgency. Musculoskeletal: Negative. Negative for back pain, myalgias and neck pain. Skin: Negative. Negative for itching and rash. Psychiatric/Behavioral: Negative. Physical Exam   Constitutional: She appears well-developed and well-nourished. No distress. HENT:   Head: Normocephalic and atraumatic.    Right Ear: External ear normal.   Left Ear: External ear normal.   Mouth/Throat: Oropharynx is clear and moist. No oropharyngeal exudate. Nasal turbinates:red inflamed,NT    Cobble stoning present. Neck: Normal range of motion. Neck supple. No JVD present. No thyromegaly present. Cardiovascular: Normal rate, regular rhythm, normal heart sounds and intact distal pulses. Pulmonary/Chest: Effort normal and breath sounds normal. No respiratory distress. She has no wheezes. Musculoskeletal: She exhibits no edema or tenderness. Diabetic foot exam:     Left: Reflexes 2+     Vibratory sensation normal    Proprioception normal   Sharp/dull discrimination normal    Filament test normal sensation with micro filament   Pulse DP: 2+ (normal)   Pulse PT: 2+ (normal)   Deformities: None  Right: Reflexes 2+   Vibratory sensation normal   Proprioception normal   Sharp/dull discrimination normal   Filament test normal sensation with micro filament   Pulse DP: 2+ (normal)   Pulse PT: 2+ (normal)   Deformities: None   Psychiatric: She has a normal mood and affect. Her behavior is normal.   anxious       ASSESSMENT and PLAN  Alba was seen today for hypertension, diabetes and cholesterol problem. Diagnoses and all orders for this visit:    Type 2 diabetes mellitus without complication, without long-term current use of insulin (HCC)    Stable. will do,  -     METABOLIC PANEL, COMPREHENSIVE  -     HEMOGLOBIN A1C WITH EAG    Essential hypertension    On med. Will do,  -     METABOLIC PANEL, COMPREHENSIVE    Mixed hyperlipidemia    Diet control. Will check,  -     METABOLIC PANEL, COMPREHENSIVE  -     LIPID PANEL    Asthma, mild intermittent, well-controlled    On inhaler. Allergic rhinitis due to pollen, unspecified rhinitis seasonality  -     ALLERGEN (24) PANEL  -     FOOD ALLERGY PROFILE  -     ALLERGEN (24) PANEL        Discussed expected course/resolution/complications of diagnosis in detail with patient.    Medication risks/benefits/costs/interactions/alternatives discussed with patient. Pt was given an after visit summary which includes diagnoses, current medications & vitals. Pt expressed understanding with the diagnosis and plan.

## 2017-05-17 NOTE — PROGRESS NOTES
Health Maintenance Due   Topic Date Due    Hepatitis C Screening  1955    Pneumococcal 19-64 Medium Risk (1 of 1 - PPSV23) 08/19/1973    DTaP/Tdap/Td series (1 - Tdap) 08/19/1975    ZOSTER VACCINE AGE 60>  08/19/2014    EYE EXAM RETINAL OR DILATED Q1  03/17/2016    FOOT EXAM Q1  06/23/2016    PAP AKA CERVICAL CYTOLOGY  06/26/2016       Chief Complaint   Patient presents with    Hypertension    Diabetes    Cholesterol Problem       1. Have you been to the ER, urgent care clinic since your last visit? Hospitalized since your last visit? No    2. Have you seen or consulted any other health care providers outside of the [address] [place] [address] since your last visit? Include any pap smears or colon screening. No    3) Do you have an Advance Directive on file? no    4) Are you interested in receiving information on Advance Directives? NO      Patient is accompanied by self I have received verbal consent from Radha Gautam to discuss any/all medical information while they are present in the room.

## 2017-05-17 NOTE — PATIENT INSTRUCTIONS

## 2017-05-20 LAB
A ALTERNATA IGE QN: <0.1 KU/L
A FUMIGATUS IGE QN: <0.1 KU/L
ALBUMIN SERPL-MCNC: 4.8 G/DL (ref 3.6–4.8)
ALBUMIN/GLOB SERPL: 1.7 {RATIO} (ref 1.2–2.2)
ALP SERPL-CCNC: 49 IU/L (ref 39–117)
ALT SERPL-CCNC: 21 IU/L (ref 0–32)
AST SERPL-CCNC: 20 IU/L (ref 0–40)
BERMUDA GRASS IGE QN: 0.12 KU/L
BILIRUB SERPL-MCNC: 0.4 MG/DL (ref 0–1.2)
BOXELDER IGE QN: 0.24 KU/L
BUN SERPL-MCNC: 13 MG/DL (ref 8–27)
BUN/CREAT SERPL: 14 (ref 12–28)
C HERBARUM IGE QN: <0.1 KU/L
CALCIUM SERPL-MCNC: 10.2 MG/DL (ref 8.7–10.3)
CAT DANDER IGE QN: 95.2 KU/L
CHLORIDE SERPL-SCNC: 100 MMOL/L (ref 96–106)
CHOLEST SERPL-MCNC: 199 MG/DL (ref 100–199)
CLAM IGE QN: 2.29 KU/L
CMN PIGWEED IGE QN: <0.1 KU/L
CO2 SERPL-SCNC: 25 MMOL/L (ref 18–29)
CODFISH IGE QN: <0.1 KU/L
COMMON RAGWEED IGE QN: 1.49 KU/L
CORN IGE QN: <0.1 KU/L
COTTONWOOD IGE QN: <0.1 KU/L
COW MILK IGE QN: 0.2 KU/L
CREAT SERPL-MCNC: 0.94 MG/DL (ref 0.57–1)
D FARINAE IGE QN: 26.8 KU/L
D PTERONYSS IGE QN: 33.6 KU/L
DOG DANDER IGE QN: 42.6 KU/L
EGG WHITE IGE QN: 0.17 KU/L
EST. AVERAGE GLUCOSE BLD GHB EST-MCNC: 154 MG/DL
GLOBULIN SER CALC-MCNC: 2.8 G/DL (ref 1.5–4.5)
GLUCOSE SERPL-MCNC: 168 MG/DL (ref 65–99)
HBA1C MFR BLD: 7 % (ref 4.8–5.6)
HDLC SERPL-MCNC: 57 MG/DL
IGE SERPL-ACNC: 625 IU/ML (ref 0–100)
INTERPRETATION, 910389: NORMAL
JOHNSON GRASS IGE QN: 0.84 KU/L
LDLC SERPL CALC-MCNC: 120 MG/DL (ref 0–99)
Lab: ABNORMAL
Lab: ABNORMAL
Lab: NORMAL
MOUSE URINE PROT IGE QN: 1.2 KU/L
MT JUNIPER IGE QN: 0.76 KU/L
P NOTATUM IGE QN: <0.1 KU/L
PEANUT IGE QN: <0.1 KU/L
PECAN/HICK TREE IGE QN: <0.1 KU/L
POTASSIUM SERPL-SCNC: 4.7 MMOL/L (ref 3.5–5.2)
PROT SERPL-MCNC: 7.6 G/DL (ref 6–8.5)
ROACH IGE QN: 2.52 KU/L
SCALLOP IGE QN: 1.78 KU/L
SESAME SEED IGE QN: <0.1 KU/L
SHEEP SORREL IGE QN: 0.21 KU/L
SHRIMP IGE QN: 8.06 KU/L
SILVER BIRCH IGE QN: <0.1 KU/L
SODIUM SERPL-SCNC: 142 MMOL/L (ref 134–144)
SOYBEAN IGE QN: 1.39 KU/L
TIMOTHY IGE QN: 5.49 KU/L
TRIGL SERPL-MCNC: 110 MG/DL (ref 0–149)
VLDLC SERPL CALC-MCNC: 22 MG/DL (ref 5–40)
WALNUT IGE QN: <0.1 KU/L
WHEAT IGE QN: <0.1 KU/L
WHITE ELM IGE QN: <0.1 KU/L
WHITE MULBERRY IGE QN: <0.1 KU/L
WHITE OAK IGE QN: <0.1 KU/L

## 2017-05-31 NOTE — PROGRESS NOTES
Lot of allergy. please send her a copy. refer her to allergist Jacqueline Kumar. still diabetic,watch carb.

## 2017-06-01 ENCOUNTER — TELEPHONE (OUTPATIENT)
Dept: INTERNAL MEDICINE CLINIC | Age: 62
End: 2017-06-01

## 2017-06-05 DIAGNOSIS — E11.9 DIABETES MELLITUS TYPE 2, CONTROLLED (HCC): ICD-10-CM

## 2017-06-05 RX ORDER — METFORMIN HYDROCHLORIDE 500 MG/1
TABLET ORAL
Qty: 180 TAB | Refills: 0 | Status: SHIPPED | OUTPATIENT
Start: 2017-06-05 | End: 2017-07-28 | Stop reason: SDUPTHER

## 2017-07-18 DIAGNOSIS — J45.20 ASTHMA, MILD INTERMITTENT, WELL-CONTROLLED: ICD-10-CM

## 2017-07-18 RX ORDER — LISINOPRIL AND HYDROCHLOROTHIAZIDE 20; 25 MG/1; MG/1
TABLET ORAL
Qty: 90 TAB | Refills: 1 | Status: SHIPPED | OUTPATIENT
Start: 2017-07-18 | End: 2017-08-04 | Stop reason: SDUPTHER

## 2017-07-18 RX ORDER — MONTELUKAST SODIUM 10 MG/1
TABLET ORAL
Qty: 90 TAB | Refills: 2 | Status: SHIPPED | OUTPATIENT
Start: 2017-07-18 | End: 2017-07-28 | Stop reason: SDUPTHER

## 2017-07-18 RX ORDER — ALBUTEROL SULFATE 90 UG/1
AEROSOL, METERED RESPIRATORY (INHALATION)
Qty: 24 INHALER | Refills: 1 | Status: SHIPPED | OUTPATIENT
Start: 2017-07-18 | End: 2017-07-28 | Stop reason: SDUPTHER

## 2017-07-28 DIAGNOSIS — J45.20 ASTHMA, MILD INTERMITTENT, WELL-CONTROLLED: ICD-10-CM

## 2017-07-28 DIAGNOSIS — E11.9 DIABETES MELLITUS TYPE 2, CONTROLLED (HCC): ICD-10-CM

## 2017-08-04 RX ORDER — MONTELUKAST SODIUM 10 MG/1
10 TABLET ORAL DAILY
Qty: 90 TAB | Refills: 2 | Status: SHIPPED | OUTPATIENT
Start: 2017-08-04 | End: 2017-09-18 | Stop reason: SDUPTHER

## 2017-08-04 RX ORDER — ALBUTEROL SULFATE 90 UG/1
2 AEROSOL, METERED RESPIRATORY (INHALATION)
Qty: 24 INHALER | Refills: 1 | Status: SHIPPED | OUTPATIENT
Start: 2017-08-04 | End: 2018-03-24 | Stop reason: SDUPTHER

## 2017-08-04 RX ORDER — METFORMIN HYDROCHLORIDE 500 MG/1
500 TABLET ORAL 2 TIMES DAILY WITH MEALS
Qty: 180 TAB | Refills: 0 | Status: SHIPPED | OUTPATIENT
Start: 2017-08-04 | End: 2017-12-04 | Stop reason: SDUPTHER

## 2017-08-04 RX ORDER — LISINOPRIL AND HYDROCHLOROTHIAZIDE 20; 25 MG/1; MG/1
TABLET ORAL
Qty: 90 TAB | Refills: 1 | Status: SHIPPED | OUTPATIENT
Start: 2017-08-04 | End: 2018-03-02 | Stop reason: SDUPTHER

## 2017-08-04 RX ORDER — MOMETASONE FUROATE 1 MG/G
OINTMENT TOPICAL DAILY
Qty: 30 G | Refills: 0 | Status: SHIPPED | OUTPATIENT
Start: 2017-08-04

## 2017-09-05 DIAGNOSIS — J45.20 ASTHMA, MILD INTERMITTENT, WELL-CONTROLLED: ICD-10-CM

## 2017-09-06 RX ORDER — ALBUTEROL SULFATE 90 UG/1
AEROSOL, METERED RESPIRATORY (INHALATION)
Qty: 1 INHALER | Refills: 1 | Status: SHIPPED | OUTPATIENT
Start: 2017-09-06 | End: 2018-12-06 | Stop reason: ALTCHOICE

## 2017-09-18 ENCOUNTER — OFFICE VISIT (OUTPATIENT)
Dept: INTERNAL MEDICINE CLINIC | Age: 62
End: 2017-09-18

## 2017-09-18 VITALS
WEIGHT: 176 LBS | BODY MASS INDEX: 30.05 KG/M2 | RESPIRATION RATE: 20 BRPM | SYSTOLIC BLOOD PRESSURE: 133 MMHG | HEIGHT: 64 IN | TEMPERATURE: 97.4 F | HEART RATE: 85 BPM | DIASTOLIC BLOOD PRESSURE: 84 MMHG | OXYGEN SATURATION: 97 %

## 2017-09-18 DIAGNOSIS — M70.51 BURSITIS OF OTHER BURSA OF RIGHT KNEE: ICD-10-CM

## 2017-09-18 DIAGNOSIS — E11.9 TYPE 2 DIABETES MELLITUS WITHOUT COMPLICATION, WITHOUT LONG-TERM CURRENT USE OF INSULIN (HCC): Primary | ICD-10-CM

## 2017-09-18 DIAGNOSIS — J45.20 ASTHMA, MILD INTERMITTENT, WELL-CONTROLLED: ICD-10-CM

## 2017-09-18 DIAGNOSIS — Z12.39 SCREENING BREAST EXAMINATION: ICD-10-CM

## 2017-09-18 DIAGNOSIS — I10 ESSENTIAL HYPERTENSION: ICD-10-CM

## 2017-09-18 DIAGNOSIS — E78.2 MIXED HYPERLIPIDEMIA: ICD-10-CM

## 2017-09-18 NOTE — PROGRESS NOTES
HISTORY OF PRESENT ILLNESS  Alba Sellers is a 60 y.o. female here for follow up.  has asthma.using inhaler. Allergy is worse. lot of post nasal drip. Not able to loose weight. doing regular exercise. She is seen for diabetes. He reports medication compliance: compliant most of the time. Diabetic diet compliance: compliant most of the time. Home glucose monitoring: is not performed. Further diabetic ROS: no polyuria or polydipsia, no chest pain, dyspnea or TIA's, no numbness, tingling or pain in extremities, no hypoglycemia. Lab review: labs reviewed and discussed with patient. Eye exam: up to date. reports right knee pain. no fall or injury,. Have HTN,on meds. Need mammogram.  Hypertension    Pertinent negatives include no chest pain, no palpitations, no blurred vision, no neck pain and no nausea. Diabetes   Pertinent negatives include no chest pain. Cholesterol Problem   Pertinent negatives include no chest pain. Asthma   Pertinent negatives include no chest pain. Review of Systems   Constitutional: Negative. Negative for chills and fever. HENT: Negative. Eyes: Negative. Negative for blurred vision and double vision. Respiratory: Negative. Cardiovascular: Negative. Negative for chest pain and palpitations. Gastrointestinal: Negative. Negative for heartburn and nausea. Genitourinary: Negative. Negative for dysuria and urgency. Musculoskeletal: Positive for joint pain. Negative for back pain, myalgias and neck pain. Skin: Negative. Negative for itching and rash. Psychiatric/Behavioral: Negative. Physical Exam   Constitutional: She appears well-developed and well-nourished. No distress. HENT:   Head: Normocephalic and atraumatic. Right Ear: External ear normal.   Left Ear: External ear normal.   Mouth/Throat: Oropharynx is clear and moist. No oropharyngeal exudate. Nasal turbinates:red inflamed,NT    Cobble stoning present. Neck: Normal range of motion. Neck supple. No JVD present. No thyromegaly present. Cardiovascular: Normal rate, regular rhythm, normal heart sounds and intact distal pulses. Pulmonary/Chest: Effort normal and breath sounds normal. No respiratory distress. She has no wheezes. Musculoskeletal: She exhibits no edema or tenderness. Right knee:NT ROm OK. slightly swollen. Psychiatric: She has a normal mood and affect. Her behavior is normal.   anxious       ASSESSMENT and PLAN    Diagnoses and all orders for this visit:    1. Type 2 diabetes mellitus without complication, without long-term current use of insulin (HCC)    Stable. will do,  -     METABOLIC PANEL, COMPREHENSIVE  -     HEMOGLOBIN A1C WITH EAG  -     MICROALBUMIN, UR, RAND    2. Essential hypertension    Stable. will check,  -     METABOLIC PANEL, COMPREHENSIVE    3. Mixed hyperlipidemia  Stable. 4. Asthma, mild intermittent, well-controlled  On inahler. 5. Bursitis of other bursa of right knee    advil PRN,ice pack. Need to use knee brace. -     Leg Brace (ACE KNEE BRACE) misc; by Does Not Apply route. Use on right knee QD    6. Screening breast examination  -     [facility] MAMMO BI SCREENING INCL CAD; Future    Discussed expected course/resolution/complications of diagnosis in detail with patient. Medication risks/benefits/costs/interactions/alternatives discussed with patient. Pt was given an after visit summary which includes diagnoses, current medications & vitals. Pt expressed understanding with the diagnosis and plan.

## 2017-09-18 NOTE — MR AVS SNAPSHOT
Visit Information Date & Time Provider Department Dept. Phone Encounter #  
 9/18/2017  8:30 AM Leida Serrano, [address] [facility] Internal Medicine [phone] [id number] Upcoming Health Maintenance Date Due Hepatitis C Screening 1955 Pneumococcal 19-64 Medium Risk (1 of 1 - PPSV23) 8/19/1973 DTaP/Tdap/Td series (1 - Tdap) 8/19/1975 ZOSTER VACCINE AGE 60> 6/19/2014 EYE EXAM RETINAL OR DILATED Q1 3/17/2016 PAP AKA CERVICAL CYTOLOGY 6/26/2016 INFLUENZA AGE 9 TO ADULT 8/1/2017 BREAST CANCER SCRN MAMMOGRAM 12/28/2017 HEMOGLOBIN A1C Q6M 11/17/2017 MICROALBUMIN Q1 1/16/2018 FOOT EXAM Q1 5/17/2018 LIPID PANEL Q1 5/17/2018 COLONOSCOPY 1/3/2026 Allergies as of 9/18/2017  Review Complete On: 9/18/2017 By: Leida Serrano MD  
  
 Severity Noted Reaction Type Reactions Seafood High 09/19/2016    Anaphylaxis Current Immunizations  Reviewed on 1/16/2017 No immunizations on file. Not reviewed this visit You Were Diagnosed With   
  
 Codes Comments Type 2 diabetes mellitus without complication, without long-term current use of insulin (HCC)    -  Primary ICD-10-CM: E11.9 ICD-9-CM: 250.00 Essential hypertension     ICD-10-CM: I10 
ICD-9-CM: 401.9 Mixed hyperlipidemia     ICD-10-CM: E78.2 ICD-9-CM: 272.2 Asthma, mild intermittent, well-controlled     ICD-10-CM: J45.20 ICD-9-CM: 493.90 Bursitis of other bursa of right knee     ICD-10-CM: M70.51 Vitals BP Pulse Temp Resp Height(growth percentile) Weight(growth percentile) 133/84 (BP 1 Location: Left arm, BP Patient Position: Sitting) 85 97.4 °F (36.3 °C) (Oral) 20 5' 4\" (1.626 m) 176 lb (79.8 kg) SpO2 BMI OB Status Smoking Status 97% 30.21 kg/m2 Postmenopausal Former Smoker Vitals History BMI and BSA Data Body Mass Index Body Surface Area  
 30.21 kg/m 2 1.9 m 2 Preferred Pharmacy Pharmacy Name Phone [facility] [address]  [place] [phone] Your Updated Medication List  
  
   
This list is accurate as of: 9/18/17  9:07 AM.  Always use your most recent med list.  
  
  
  
  
 * albuterol 2.5 mg /3 mL (0.083 %) nebulizer solution Commonly known as:  PROVENTIL VENTOLIN  
3 mL by Nebulization route three (3) times daily as needed for Wheezing. * albuterol 90 mcg/actuation inhaler Commonly known as:  PROAIR HFA Take 2 Puffs by inhalation every four (4) hours as needed for Wheezing. * PROAIR HFA 90 mcg/actuation inhaler Generic drug:  albuterol INHALE 2 PUFFS BY MOUTH EVERY 6 HOURS AS NEEDED FOR WHEEZING Blood-Glucose Meter monitoring kit Check sugar QD  
  
 fluticasone-salmeterol 250-50 mcg/dose diskus inhaler Commonly known as:  ADVAIR DISKUS Take 1 Puff by inhalation two (2) times a day. * glucose blood VI test strips strip Commonly known as:  ONETOUCH ULTRA TEST Check BS QD  
  
 * glucose blood VI test strips strip Commonly known as:  ASCENSIA AUTODISC VI, ONE TOUCH ULTRA TEST VI To test blood glucose daily Lancets Misc Check sugar QD Leg Brace Misc Commonly known as:  ACE KNEE BRACE  
by Does Not Apply route. Use on right knee QD  
  
 lisinopril-hydroCHLOROthiazide 20-25 mg per tablet Commonly known as:  PRINZIDE, ZESTORETIC  
TAKE 1 TABLET BY MOUTH EVERY DAY  
  
 metFORMIN 500 mg tablet Commonly known as:  GLUCOPHAGE Take 1 Tab by mouth two (2) times daily (with meals). mometasone 0.1 % ointment Commonly known as:  lOive Isidro Apply  to affected area daily. montelukast 10 mg tablet Commonly known as:  SINGULAIR Take 1 Tab by mouth daily. Nebulizer & Compressor machine Use TID PRN for wheezing Nebulizer Accessories Kit  
by Does Not Apply route.  USE PRN WHEEZING  
  
 * Notice: This list has 5 medication(s) that are the same as other medications prescribed for you. Read the directions carefully, and ask your doctor or other care provider to review them with you. Prescriptions Printed Refills Leg Brace (ACE KNEE BRACE) misc 0 Sig: by Does Not Apply route. Use on right knee QD Class: Print Route: Does Not Apply We Performed the Following HEMOGLOBIN A1C WITH EAG [48589 CPT(R)] METABOLIC PANEL, COMPREHENSIVE [75342 CPT(R)] MICROALBUMIN, UR, RAND [id number] CPT(R)] Patient Instructions DASH Diet: Care Instructions Your Care Instructions The DASH diet is an eating plan that can help lower your blood pressure. DASH stands for Dietary Approaches to Stop Hypertension. Hypertension is high blood pressure. The DASH diet focuses on eating foods that are high in calcium, potassium, and magnesium. These nutrients can lower blood pressure. The foods that are highest in these nutrients are fruits, vegetables, low-fat dairy products, nuts, seeds, and legumes. But taking calcium, potassium, and magnesium supplements instead of eating foods that are high in those nutrients does not have the same effect. The DASH diet also includes whole grains, fish, and poultry. The DASH diet is one of several lifestyle changes your doctor may recommend to lower your high blood pressure. Your doctor may also want you to decrease the amount of sodium in your diet. Lowering sodium while following the DASH diet can lower blood pressure even further than just the DASH diet alone. Follow-up care is a key part of your treatment and safety. Be sure to make and go to all appointments, and call your doctor if you are having problems. It's also a good idea to know your test results and keep a list of the medicines you take. How can you care for yourself at home? Following the DASH diet · Eat 4 to 5 servings of fruit each day. A serving is 1 medium-sized piece of fruit, ½ cup chopped or canned fruit, 1/4 cup dried fruit, or 4 ounces (½ cup) of fruit juice. Choose fruit more often than fruit juice. · Eat 4 to 5 servings of vegetables each day. A serving is 1 cup of lettuce or raw leafy vegetables, ½ cup of chopped or cooked vegetables, or 4 ounces (½ cup) of vegetable juice. Choose vegetables more often than vegetable juice. · Get 2 to 3 servings of low-fat and fat-free dairy each day. A serving is 8 ounces of milk, 1 cup of yogurt, or 1 ½ ounces of cheese. · Eat 6 to 8 servings of grains each day. A serving is 1 slice of bread, 1 ounce of dry cereal, or ½ cup of cooked rice, pasta, or cooked cereal. Try to choose whole-grain products as much as possible. · Limit lean meat, poultry, and fish to 2 servings each day. A serving is 3 ounces, about the size of a deck of cards. · Eat 4 to 5 servings of nuts, seeds, and legumes (cooked dried beans, lentils, and split peas) each week. A serving is 1/3 cup of nuts, 2 tablespoons of seeds, or ½ cup of cooked beans or peas. · Limit fats and oils to 2 to 3 servings each day. A serving is 1 teaspoon of vegetable oil or 2 tablespoons of salad dressing. · Limit sweets and added sugars to 5 servings or less a week. A serving is 1 tablespoon jelly or jam, ½ cup sorbet, or 1 cup of lemonade. · Eat less than 2,300 milligrams (mg) of sodium a day. If you limit your sodium to 1,500 mg a day, you can lower your blood pressure even more. Tips for success · Start small. Do not try to make dramatic changes to your diet all at once. You might feel that you are missing out on your favorite foods and then be more likely to not follow the plan. Make small changes, and stick with them. Once those changes become habit, add a few more changes. · Try some of the following: ¨ Make it a goal to eat a fruit or vegetable at every meal and at snacks. This will make it easy to get the recommended amount of fruits and vegetables each day. ¨ Try yogurt topped with fruit and nuts for a snack or healthy dessert. ¨ Add lettuce, tomato, cucumber, and onion to sandwiches. ¨ Combine a ready-made pizza crust with low-fat mozzarella cheese and lots of vegetable toppings. Try using tomatoes, squash, spinach, broccoli, carrots, cauliflower, and onions. ¨ Have a variety of cut-up vegetables with a low-fat dip as an appetizer instead of chips and dip. ¨ Sprinkle sunflower seeds or chopped almonds over salads. Or try adding chopped walnuts or almonds to cooked vegetables. ¨ Try some vegetarian meals using beans and peas. Add garbanzo or kidney beans to salads. Make burritos and tacos with mashed nunes beans or black beans. Where can you learn more? Go to http://sol-hardik.info/. Enter S157 in the search box to learn more about \"DASH Diet: Care Instructions. \" Current as of: April 3, 2017 Content Version: 11.3 © 4166-8510 [employer]. Care instructions adapted under license by [employer] (which disclaims liability or warranty for this information). If you have questions about a medical condition or this instruction, always ask your healthcare professional. [place] [address] any warranty or liability for your use of this information. Introducing hospitals & HEALTH SERVICES! Dear Juan Upton: Thank you for requesting a [employer] account. Our records indicate that you already have an active [employer] account. You can access your account anytime at [email or web address] Did you know that you can access your hospital and ER discharge instructions at any time in [employer]? You can also review all of your test results from your hospital stay or ER visit. Additional Information If you have questions, please visit the Frequently Asked Questions section of the [employer] website at [email or web address] Remember, [employer] is NOT to be used for urgent needs. For medical emergencies, dial 911. Now available from your iPhone and Android! Please provide this summary of care documentation to your next provider. Your primary care clinician is listed as Reese Kelley. If you have any questions after today's visit, please call [phone].

## 2017-09-18 NOTE — PATIENT INSTRUCTIONS

## 2017-09-18 NOTE — PROGRESS NOTES
Health Maintenance Due   Topic Date Due    Hepatitis C Screening  1955    Pneumococcal 19-64 Medium Risk (1 of 1 - PPSV23) 08/19/1973    DTaP/Tdap/Td series (1 - Tdap) 08/19/1975    ZOSTER VACCINE AGE 60>  06/19/2014    EYE EXAM RETINAL OR DILATED Q1  03/17/2016    PAP AKA CERVICAL CYTOLOGY  06/26/2016    INFLUENZA AGE 9 TO ADULT  08/01/2017    BREAST CANCER SCRN MAMMOGRAM  12/28/2017       Chief Complaint   Patient presents with    Hypertension     4 month follow    Coronary Artery Disease    Diabetes       1. Have you been to the ER, urgent care clinic since your last visit? Hospitalized since your last visit? No    2. Have you seen or consulted any other health care providers outside of the [address] [place] [address] since your last visit? Include any pap smears or colon screening. No    3) Do you have an Advance Directive on file? no    4) Are you interested in receiving information on Advance Directives? NO      Patient is accompanied by self I have received verbal consent from Jackelin Jameson to discuss any/all medical information while they are present in the room.

## 2017-09-20 LAB
ALBUMIN SERPL-MCNC: 4.7 G/DL (ref 3.6–4.8)
ALBUMIN/GLOB SERPL: 1.4 {RATIO} (ref 1.2–2.2)
ALP SERPL-CCNC: 53 IU/L (ref 39–117)
ALT SERPL-CCNC: 18 IU/L (ref 0–32)
AST SERPL-CCNC: 20 IU/L (ref 0–40)
BILIRUB SERPL-MCNC: 0.5 MG/DL (ref 0–1.2)
BUN SERPL-MCNC: 14 MG/DL (ref 8–27)
BUN/CREAT SERPL: 13 (ref 12–28)
CALCIUM SERPL-MCNC: 9.6 MG/DL (ref 8.7–10.3)
CHLORIDE SERPL-SCNC: 100 MMOL/L (ref 96–106)
CO2 SERPL-SCNC: 26 MMOL/L (ref 18–29)
CREAT SERPL-MCNC: 1.05 MG/DL (ref 0.57–1)
EST. AVERAGE GLUCOSE BLD GHB EST-MCNC: 157 MG/DL
GLOBULIN SER CALC-MCNC: 3.3 G/DL (ref 1.5–4.5)
GLUCOSE SERPL-MCNC: 140 MG/DL (ref 65–99)
HBA1C MFR BLD: 7.1 % (ref 4.8–5.6)
INTERPRETATION: NORMAL
Lab: NORMAL
MICROALBUMIN UR-MCNC: 13.6 UG/ML
POTASSIUM SERPL-SCNC: 4.1 MMOL/L (ref 3.5–5.2)
PROT SERPL-MCNC: 8 G/DL (ref 6–8.5)
SODIUM SERPL-SCNC: 143 MMOL/L (ref 134–144)

## 2017-10-16 DIAGNOSIS — J45.909 ASTHMA, MODERATE: ICD-10-CM

## 2017-10-16 DIAGNOSIS — J45.20 ASTHMA, MILD INTERMITTENT, WELL-CONTROLLED: ICD-10-CM

## 2017-10-16 RX ORDER — MONTELUKAST SODIUM 10 MG/1
TABLET ORAL
Qty: 90 TAB | Refills: 0 | Status: SHIPPED | OUTPATIENT
Start: 2017-10-16 | End: 2018-01-03 | Stop reason: SDUPTHER

## 2017-10-24 DIAGNOSIS — J45.909 ASTHMA, MODERATE: ICD-10-CM

## 2017-10-24 DIAGNOSIS — J45.20 ASTHMA, MILD INTERMITTENT, WELL-CONTROLLED: ICD-10-CM

## 2017-10-25 RX ORDER — FLUTICASONE PROPIONATE AND SALMETEROL 50; 250 UG/1; UG/1
POWDER RESPIRATORY (INHALATION)
Qty: 1 INHALER | Refills: 5 | Status: SHIPPED | OUTPATIENT
Start: 2017-10-25 | End: 2018-07-23 | Stop reason: SDUPTHER

## 2017-11-30 ENCOUNTER — TELEPHONE (OUTPATIENT)
Dept: INTERNAL MEDICINE CLINIC | Age: 62
End: 2017-11-30

## 2017-11-30 DIAGNOSIS — R30.0 DYSURIA: Primary | ICD-10-CM

## 2017-11-30 RX ORDER — CIPROFLOXACIN 500 MG/1
500 TABLET ORAL 2 TIMES DAILY
Qty: 10 TAB | Refills: 0 | Status: SHIPPED | OUTPATIENT
Start: 2017-11-30 | End: 2017-12-05

## 2017-11-30 NOTE — PROGRESS NOTES
Requested Prescriptions     Signed Prescriptions Disp Refills    ciprofloxacin HCl (CIPRO) 500 mg tablet 10 Tab 0     Sig: Take 1 Tab by mouth two (2) times a day for 5 days. Kelly. Kit Lewis, verbal order received.

## 2017-11-30 NOTE — TELEPHONE ENCOUNTER
Pt called and advised that she needs a prescription sent to her phcy for a UTI. Says she she needs it as soon as possible but was not willing to make appt when I suggested.

## 2017-11-30 NOTE — TELEPHONE ENCOUNTER
Call,placed to pt and with sx of dysuria and frequemcy, verbal order from Dr Masood Pa for Cipro x 5 days

## 2017-12-04 DIAGNOSIS — E11.9 DIABETES MELLITUS TYPE 2, CONTROLLED (HCC): ICD-10-CM

## 2017-12-05 RX ORDER — METFORMIN HYDROCHLORIDE 500 MG/1
TABLET ORAL
Qty: 180 TAB | Refills: 0 | Status: SHIPPED | OUTPATIENT
Start: 2017-12-05 | End: 2017-12-15 | Stop reason: SDUPTHER

## 2017-12-14 ENCOUNTER — OFFICE VISIT (OUTPATIENT)
Dept: INTERNAL MEDICINE CLINIC | Age: 62
End: 2017-12-14

## 2017-12-14 VITALS
WEIGHT: 172 LBS | TEMPERATURE: 96.5 F | HEART RATE: 86 BPM | HEIGHT: 64 IN | DIASTOLIC BLOOD PRESSURE: 70 MMHG | OXYGEN SATURATION: 96 % | SYSTOLIC BLOOD PRESSURE: 118 MMHG | BODY MASS INDEX: 29.37 KG/M2 | RESPIRATION RATE: 16 BRPM

## 2017-12-14 DIAGNOSIS — E11.9 TYPE 2 DIABETES MELLITUS WITHOUT COMPLICATION, WITHOUT LONG-TERM CURRENT USE OF INSULIN (HCC): ICD-10-CM

## 2017-12-14 DIAGNOSIS — R30.9 PAINFUL URINATION: Primary | ICD-10-CM

## 2017-12-14 LAB
BACTERIA UA POCT, BACTPOCT: NORMAL
BILIRUB UR QL STRIP: NEGATIVE
CASTS UA POCT: NORMAL
CLUE CELLS, CLUEPOCT: NORMAL
CRYSTALS UA POCT, CRYSPOCT: NORMAL
EPITHELIAL CELLS POCT: NORMAL
GLUCOSE UR-MCNC: NEGATIVE MG/DL
KETONES P FAST UR STRIP-MCNC: NEGATIVE MG/DL
MUCUS UA POCT, MUCPOCT: NORMAL
PH UR STRIP: 5.5 [PH] (ref 4.6–8)
PROT UR QL STRIP: NEGATIVE
RBC UA POCT, RBCPOCT: NORMAL
SP GR UR STRIP: 1.02 (ref 1–1.03)
TRICH UA POCT, TRICHPOC: NORMAL
UA UROBILINOGEN AMB POC: NORMAL (ref 0.2–1)
URINALYSIS CLARITY POC: CLEAR
URINALYSIS COLOR POC: YELLOW
URINE BLOOD POC: NORMAL
URINE CULT COMMENT, POCT: NORMAL
URINE LEUKOCYTES POC: NEGATIVE
URINE NITRITES POC: NEGATIVE
WBC UA POCT, WBCPOCT: NORMAL
YEAST UA POCT, YEASTPOC: NORMAL

## 2017-12-14 NOTE — PATIENT INSTRUCTIONS
Learning About Diabetes Food Guidelines  Your Care Instructions    Meal planning is important to manage diabetes. It helps keep your blood sugar at a target level (which you set with your doctor). You don't have to eat special foods. You can eat what your family eats, including sweets once in a while. But you do have to pay attention to how often you eat and how much you eat of certain foods. You may want to work with a dietitian or a certified diabetes educator (CDE) to help you plan meals and snacks. A dietitian or CDE can also help you lose weight if that is one of your goals. What should you know about eating carbs? Managing the amount of carbohydrate (carbs) you eat is an important part of healthy meals when you have diabetes. Carbohydrate is found in many foods. · Learn which foods have carbs. And learn the amounts of carbs in different foods. ¨ Bread, cereal, pasta, and rice have about 15 grams of carbs in a serving. A serving is 1 slice of bread (1 ounce), ½ cup of cooked cereal, or 1/3 cup of cooked pasta or rice. ¨ Fruits have 15 grams of carbs in a serving. A serving is 1 small fresh fruit, such as an apple or orange; ½ of a banana; ½ cup of cooked or canned fruit; ½ cup of fruit juice; 1 cup of melon or raspberries; or 2 tablespoons of dried fruit. ¨ Milk and no-sugar-added yogurt have 15 grams of carbs in a serving. A serving is 1 cup of milk or 2/3 cup of no-sugar-added yogurt. ¨ Starchy vegetables have 15 grams of carbs in a serving. A serving is ½ cup of mashed potatoes or sweet potato; 1 cup winter squash; ½ of a small baked potato; ½ cup of cooked beans; or ½ cup cooked corn or green peas. · Learn how much carbs to eat each day and at each meal. A dietitian or CDE can teach you how to keep track of the amount of carbs you eat. This is called carbohydrate counting. · If you are not sure how to count carbohydrate grams, use the Plate Method to plan meals.  It is a good, quick way to make sure that you have a balanced meal. It also helps you spread carbs throughout the day. ¨ Divide your plate by types of foods. Put non-starchy vegetables on half the plate, meat or other protein food on one-quarter of the plate, and a grain or starchy vegetable in the final quarter of the plate. To this you can add a small piece of fruit and 1 cup of milk or yogurt, depending on how many carbs you are supposed to eat at a meal.  · Try to eat about the same amount of carbs at each meal. Do not \"save up\" your daily allowance of carbs to eat at one meal.  · Proteins have very little or no carbs per serving. Examples of proteins are beef, chicken, turkey, fish, eggs, tofu, cheese, cottage cheese, and peanut butter. A serving size of meat is 3 ounces, which is about the size of a deck of cards. Examples of meat substitute serving sizes (equal to 1 ounce of meat) are 1/4 cup of cottage cheese, 1 egg, 1 tablespoon of peanut butter, and ½ cup of tofu. How can you eat out and still eat healthy? · Learn to estimate the serving sizes of foods that have carbohydrate. If you measure food at home, it will be easier to estimate the amount in a serving of restaurant food. · If the meal you order has too much carbohydrate (such as potatoes, corn, or baked beans), ask to have a low-carbohydrate food instead. Ask for a salad or green vegetables. · If you use insulin, check your blood sugar before and after eating out to help you plan how much to eat in the future. · If you eat more carbohydrate at a meal than you had planned, take a walk or do other exercise. This will help lower your blood sugar. What else should you know? · Limit saturated fat, such as the fat from meat and dairy products. This is a healthy choice because people who have diabetes are at higher risk of heart disease. So choose lean cuts of meat and nonfat or low-fat dairy products.  Use olive or canola oil instead of butter or shortening when cooking. · Don't skip meals. Your blood sugar may drop too low if you skip meals and take insulin or certain medicines for diabetes. · Check with your doctor before you drink alcohol. Alcohol can cause your blood sugar to drop too low. Alcohol can also cause a bad reaction if you take certain diabetes medicines. Follow-up care is a key part of your treatment and safety. Be sure to make and go to all appointments, and call your doctor if you are having problems. It's also a good idea to know your test results and keep a list of the medicines you take. Where can you learn more? Go to http://sol-hardik.info/. Enter L822 in the search box to learn more about \"Learning About Diabetes Food Guidelines. \"  Current as of: March 13, 2017  Content Version: 11.4  © 5376-6809 [employer]. Care instructions adapted under license by [employer] (which disclaims liability or warranty for this information). If you have questions about a medical condition or this instruction, always ask your healthcare professional. [place] [address] any warranty or liability for your use of this information. Learning About Meal Planning for Diabetes  Why plan your meals? Meal planning can be a key part of managing diabetes. Planning meals and snacks with the right balance of carbohydrate, protein, and fat can help you keep your blood sugar at the target level you set with your doctor. You don't have to eat special foods. You can eat what your family eats, including sweets once in a while. But you do have to pay attention to how often you eat and how much you eat of certain foods. You may want to work with a dietitian or a certified diabetes educator. He or she can give you tips and meal ideas and can answer your questions about meal planning. This health professional can also help you reach a healthy weight if that is one of your goals. What plan is right for you?   Your dietitian or diabetes educator may suggest that you start with the plate format or carbohydrate counting. The plate format  The plate format is a simple way to help you manage how you eat. You plan meals by learning how much space each food should take on a plate. Using the plate format helps you spread carbohydrate throughout the day. It can make it easier to keep your blood sugar level within your target range. It also helps you see if you're eating healthy portion sizes. To use the plate format, you put non-starchy vegetables on half your plate. Add meat or meat substitutes on one-quarter of the plate. Put a grain or starchy vegetable (such as brown rice or a potato) on the final quarter of the plate. You can add a small piece of fruit and some low-fat or fat-free milk or yogurt, depending on your carbohydrate goal for each meal.  Here are some tips for using the plate format:  · Make sure that you are not using an oversized plate. A 9-inch plate is best. Many restaurants use larger plates. · Get used to using the plate format at home. Then you can use it when you eat out. · Write down your questions about using the plate format. Talk to your doctor, a dietitian, or a diabetes educator about your concerns. Carbohydrate counting  With carbohydrate counting, you plan meals based on the amount of carbohydrate in each food. Carbohydrate raises blood sugar higher and more quickly than any other nutrient. It is found in desserts, breads and cereals, and fruit. It's also found in starchy vegetables such as potatoes and corn, grains such as rice and pasta, and milk and yogurt. Spreading carbohydrate throughout the day helps keep your blood sugar levels within your target range. Your daily amount depends on several things, including your weight, how active you are, which diabetes medicines you take, and what your goals are for your blood sugar levels.  A registered dietitian or diabetes educator can help you plan how much carbohydrate to include in each meal and snack. A guideline for your daily amount of carbohydrate is:  · 45 to 60 grams at each meal. That's about the same as 3 to 4 carbohydrate servings. · 15 to 20 grams at each snack. That's about the same as 1 carbohydrate serving. The Nutrition Facts label on packaged foods tells you how much carbohydrate is in a serving of the food. First, look at the serving size on the food label. Is that the amount you eat in a serving? All of the nutrition information on a food label is based on that serving size. So if you eat more or less than that, you'll need to adjust the other numbers. Total carbohydrate is the next thing you need to look for on the label. If you count carbohydrate servings, one serving of carbohydrate is 15 grams. For foods that don't come with labels, such as fresh fruits and vegetables, you'll need a guide that lists carbohydrate in these foods. Ask your doctor, dietitian, or diabetes educator about books or other nutrition guides you can use. If you take insulin, you need to know how many grams of carbohydrate are in a meal. This lets you know how much rapid-acting insulin to take before you eat. If you use an insulin pump, you get a constant rate of insulin during the day. So the pump must be programmed at meals to give you extra insulin to cover the rise in blood sugar after meals. When you know how much carbohydrate you will eat, you can take the right amount of insulin. Or, if you always use the same amount of insulin, you need to make sure that you eat the same amount of carbohydrate at meals. If you need more help to understand carbohydrate counting and food labels, ask your doctor, dietitian, or diabetes educator. How do you get started with meal planning? Here are some tips to get started:  · Plan your meals a week at a time. Don't forget to include snacks too. · Use cookbooks or online recipes to plan several main meals.  Plan some quick meals for busy nights. You also can double some recipes that freeze well. Then you can save half for other busy nights when you don't have time to cook. · Make sure you have the ingredients you need for your recipes. If you're running low on basic items, put these items on your shopping list too. · List foods that you use to make breakfasts, lunches, and snacks. List plenty of fruits and vegetables. · Post this list on the refrigerator. Add to it as you think of more things you need. · Take the list to the store to do your weekly shopping. Follow-up care is a key part of your treatment and safety. Be sure to make and go to all appointments, and call your doctor if you are having problems. It's also a good idea to know your test results and keep a list of the medicines you take. Where can you learn more? Go to http://sol-hardik.info/. Shelton Mack in the search box to learn more about \"Learning About Meal Planning for Diabetes. \"  Current as of: March 13, 2017  Content Version: 11.4  © 4758-5062 [employer]. Care instructions adapted under license by [employer] (which disclaims liability or warranty for this information). If you have questions about a medical condition or this instruction, always ask your healthcare professional. [address] any warranty or liability for your use of this information. Nutrition Tips for Diabetes: After Your Visit  Your Care Instructions  A healthy diet is important to manage diabetes. It helps you lose weight (if you need to) and keep it off. It gives you the nutrition and energy your body needs and helps prevent heart disease. But a diet for diabetes does not mean that you have to eat special foods. You can eat what your family eats, including occasional sweets and other favorites. But you do have to pay attention to how often you eat and how much you eat of certain foods.  The right plan for you will give you meals that help you keep your blood sugar at healthy levels. Try to eat a variety of foods and to spread carbohydrate throughout the day. Carbohydrate raises blood sugar higher and more quickly than any other nutrient does. Carbohydrate is found in sugar, breads and cereals, fruit, starchy vegetables such as potatoes and corn, and milk and yogurt. You may want to work with a dietitian or diabetes educator to help you plan meals and snacks. A dietitian or diabetes educator also can help you lose weight if that is one of your goals. The following tips can help you enjoy your meals and stay healthy. Follow-up care is a key part of your treatment and safety. Be sure to make and go to all appointments, and call your doctor if you are having problems. Its also a good idea to know your test results and keep a list of the medicines you take. How can you care for yourself at home? · Learn which foods have carbohydrate and how much carbohydrate to eat. A dietitian or diabetes educator can help you learn to keep track of how much carbohydrate you eat. · Spread carbohydrate throughout the day. Eat some carbohydrate at all meals, but do not eat too much at any one time. · Plan meals to include food from all the food groups. These are the food groups and some example portion sizes:  ¨ Grains: 1 slice of bread (1 ounce), ½ cup of cooked cereal, and 1/3 cup of cooked pasta or rice. These have about 15 grams of carbohydrate in a serving. Choose whole grains such as whole wheat bread or crackers, oatmeal, and brown rice more often than refined grains. ¨ Fruit: 1 small fresh fruit, such as an apple or orange; ½ of a banana; ½ cup of chopped, cooked, or canned fruit; ½ cup of fruit juice; 1 cup of melon or raspberries; and 2 tablespoons of dried fruit. These have about 15 grams of carbohydrate in a serving. ¨ Dairy: 1 cup of nonfat or low-fat milk and 2/3 cup of plain yogurt.  These have about 15 grams of carbohydrate in a serving. ¨ Protein foods: Beef, chicken, turkey, fish, eggs, tofu, cheese, cottage cheese, and peanut butter. A serving size of meat is 3 ounces, which is about the size of a deck of cards. Examples of meat substitute serving sizes (equal to 1 ounce of meat) are 1/4 cup of cottage cheese, 1 egg, 1 tablespoon of peanut butter, and ½ cup of tofu. These have very little or no carbohydrate per serving. ¨ Vegetables: Starchy vegetables such as ½ cup of cooked dried beans, peas, potatoes, or corn have about 15 grams of carbohydrate. Nonstarchy vegetables have very little carbohydrate, such as 1 cup of raw leafy vegetables (such as spinach), ½ cup of other vegetables (cooked or chopped), and 3/4 cup of vegetable juice. · Use the plate format to plan meals. It is a good, quick way to make sure that you have a balanced meal. It also helps you spread carbohydrate throughout the day. You divide your plate by types of foods. Put vegetables on half the plate, meat or meat substitutes on one-quarter of the plate, and a grain or starchy vegetable (such as brown rice or a potato) in the final quarter of the plate. To this you can add a small piece of fruit and 1 cup of milk or yogurt, depending on how much carbohydrate you are supposed to eat at a meal.  · Talk to your dietitian or diabetes educator about ways to add limited amounts of sweets into your meal plan. You can eat these foods now and then, as long as you include the amount of carbohydrate they have in your daily carbohydrate allowance. · If you drink alcohol, limit it to no more than 1 drink a day for women and 2 drinks a day for men. If you are pregnant, no amount of alcohol is known to be safe. · Protein, fat, and fiber do not raise blood sugar as much as carbohydrate does. If you eat a lot of these nutrients in a meal, your blood sugar will rise more slowly than it would otherwise. · Limit saturated fats, such as those from meat and dairy products.  Try to replace it with monounsaturated fat, such as olive oil. This is a healthier choice because people who have diabetes are at higher-than-average risk of heart disease. But use a modest amount of olive oil. A tablespoon of olive oil has 14 grams of fat and 120 calories. · Exercise lowers blood sugar. If you take insulin by shots or pump, you can use less than you would if you were not exercising. Keep in mind that timing matters. If you exercise within 1 hour after a meal, your body may need less insulin for that meal than it would if you exercised 3 hours after the meal. Test your blood sugar to find out how exercise affects your need for insulin. · Exercise on most days of the week. Aim for at least 30 minutes. Exercise helps you stay at a healthy weight and helps your body use insulin. Walking is an easy way to get exercise. Gradually increase the amount you walk every day. You also may want to swim, bike, or do other activities. When you eat out  · Learn to estimate the serving sizes of foods that have carbohydrate. If you measure food at home, it will be easier to estimate the amount in a serving of restaurant food. · If the meal you order has too much carbohydrate (such as potatoes, corn, or baked beans), ask to have a low-carbohydrate food instead. Ask for a salad or green vegetables. · If you use insulin, check your blood sugar before and after eating out to help you plan how much to eat in the future. · If you eat more carbohydrate at a meal than you had planned, take a walk or do other exercise. This will help lower your blood sugar. Where can you learn more? Go to [employer]  Enter H136 in the search box to learn more about \"Nutrition Tips for Diabetes: After Your Visit. \"   © 3801-1415 [employer]. Care instructions adapted under license by [employer] (which disclaims liability or warranty for this information).  This care instruction is for use with your licensed healthcare professional. If you have questions about a medical condition or this instruction, always ask your healthcare professional. [place] [address] any warranty or liability for your use of this information.   Content Version: 19.1.814522; Current as of: June 4, 2014

## 2017-12-14 NOTE — LETTER
12/18/2017 12:03 PM 
 
Ms. Jagjit Baldwin [address] [id number] Dear Jagjit Baldwin: Please find your most recent results below. Resulted Orders HEMOGLOBIN A1C WITH EAG Result Value Ref Range Hemoglobin A1c 7.3 (H) 4.8 - 5.6 % Comment:  
            Pre-diabetes: 5.7 - 6.4 Diabetes: >6.4 Glycemic control for adults with diabetes: <7.0 Estimated average glucose 163 mg/dL Narrative Performed at:  [address]  [id number] : Caty Moss MD, Phone:  [phone] CBC W/O DIFF Result Value Ref Range WBC 4.2 3.4 - 10.8 x10E3/uL  
 RBC 4.71 3.77 - 5.28 x10E6/uL HGB 13.8 11.1 - 15.9 g/dL HCT 41.5 34.0 - 46.6 % MCV 88 79 - 97 fL  
 MCH 29.3 26.6 - 33.0 pg  
 MCHC 33.3 31.5 - 35.7 g/dL  
 RDW 14.1 12.3 - 15.4 % PLATELET 818 567 - 337 x10E3/uL Narrative Performed at:  [address]  [id number] : Caty Moss MD, Phone:  [phone] METABOLIC PANEL, COMPREHENSIVE Result Value Ref Range Glucose 130 (H) 65 - 99 mg/dL BUN 15 8 - 27 mg/dL Creatinine 0.99 0.57 - 1.00 mg/dL GFR est non-AA 61 >59 mL/min/1.73 GFR est AA 70 >59 mL/min/1.73  
 BUN/Creatinine ratio 15 12 - 28 Sodium 143 134 - 144 mmol/L Potassium 4.3 3.5 - 5.2 mmol/L Chloride 100 96 - 106 mmol/L  
 CO2 24 18 - 29 mmol/L Calcium 9.8 8.7 - 10.3 mg/dL Protein, total 7.7 6.0 - 8.5 g/dL Albumin 4.7 3.6 - 4.8 g/dL GLOBULIN, TOTAL 3.0 1.5 - 4.5 g/dL A-G Ratio 1.6 1.2 - 2.2 Bilirubin, total 0.4 0.0 - 1.2 mg/dL Alk. phosphatase 48 39 - 117 IU/L  
 AST (SGOT) 17 0 - 40 IU/L  
 ALT (SGPT) 18 0 - 32 IU/L Narrative Performed at:  [address]  [id number] : Caty Moss MD, Phone:  [phone] LIPID PANEL Result Value Ref Range Cholesterol, total 223 (H) 100 - 199 mg/dL Triglyceride 106 0 - 149 mg/dL HDL Cholesterol 53 >39 mg/dL VLDL, calculated 21 5 - 40 mg/dL LDL, calculated 149 (H) 0 - 99 mg/dL Narrative Performed at:  [address]  [id number] : Denise Mccollum MD, Phone:  [phone] VITAMIN D, 25 HYDROXY Result Value Ref Range VITAMIN D, 25-HYDROXY 26.5 (L) 30.0 - 100.0 ng/mL Comment:  
   Vitamin D deficiency has been defined by the [facility] practice guideline as a 
level of serum 25-OH vitamin D less than 20 ng/mL (1,2). The Endocrine Society went on to further define vitamin D 
insufficiency as a level between 21 and 29 ng/mL (2). 1. IOM ([place] of Medicine). 2010. Dietary reference 
   intakes for calcium and D. [facility]: The 
   [employer]. 2. Anibal MF, Adeola NC, Rashel SHAH, et al. 
   Evaluation, treatment, and prevention of vitamin D 
   deficiency: an Endocrine Society clinical practice 
   guideline. JCEM. 2011 Jul; 96(7):1911-30. Narrative Performed at:  [address]  [id number] : Denise Mccollum MD, Phone:  [phone] AMB POC URINALYSIS DIP STICK AUTO W/ MICRO Result Value Ref Range Color (UA POC) Yellow Clarity (UA POC) Clear Glucose (UA POC) Negative Negative Bilirubin (UA POC) Negative Negative Ketones (UA POC) Negative Negative Specific gravity (UA POC) 1.020 1.001 - 1.035 Blood (UA POC) Trace Negative pH (UA POC) 5.5 4.6 - 8.0 Protein (UA POC) Negative Negative Urobilinogen (UA POC) 0.2 mg/dL 0.2 - 1 Nitrites (UA POC) Negative Negative Leukocyte esterase (UA POC) Negative Negative Epithelial cells (UA POC) Mucus (UA POC) WBCs (UA POC) RBCs (UA POC) Casts (UA POC)  Negative Crystals (UA POC)  Negative Clue Cells (UA POC) Trichomonas (UA POC) Yeast (UA POC) Bacteria (UA POC)  Negative URINE CULT COMMENT (UA POC) CVD REPORT Result Value Ref Range INTERPRETATION Note Comment:  
   Supplemental report is available. Narrative Performed at:  [address] [place] [address]  [id number] : Frederic Rogers PhD, Phone:  [phone] DIABETES PATIENT EDUCATION Result Value Ref Range PDF Image Not applicable Narrative Performed at:  [address] [place] [address]  [id number] : Frederic Rogers PhD, Phone:  [phone] RECOMMENDATIONS: 
 
1.  Hgb A1c, 7.3.  Increase Metformin to 850 mg PO BID.  Change sent to pharmacy. Encourage patient to eat a healther Mediterranean style diet with 55% or less of calories from carbohydrates. Try to eat more vegetables, whole fruit, nuts, whole grains, yogurt and less refined grains. Eat less red meat. Chicken and fish would be good protein sources. Aim to eat less than 45 grams of carbohydrates per meal.  
 
2.  Total cholesterol and LDL are elevated.  Recommend that patient watch diet for fatty foods and exercise as tolerated. 3.  Low vit D. Adv to take OTC vit D 1000 unit po every day for 4 months. 4. Other labs stable. Please call me if you have any questions: [phone] Sincerely, Alex Garcia NP

## 2017-12-14 NOTE — MR AVS SNAPSHOT
Visit Information Date & Time Provider Department Dept. Phone Encounter #  
 12/14/2017  8:20 AM Maria Del Rosario Tabares NP [facility] Internal Medicine [phone] [id number] Your Appointments 1/17/2018  8:15 AM  
ROUTINE CARE with Charlette Dolan MD  
[facility] Internal Medicine [facility]) Appt Note: 4 mo [address] [place] [address]  
[phone]  
  
   
 [address] [facility] [address] Upcoming Health Maintenance Date Due Hepatitis C Screening 1955 Pneumococcal 19-64 Medium Risk (1 of 1 - PPSV23) 8/19/1973 DTaP/Tdap/Td series (1 - Tdap) 8/19/1975 ZOSTER VACCINE AGE 60> 6/19/2014 EYE EXAM RETINAL OR DILATED Q1 3/17/2016 PAP AKA CERVICAL CYTOLOGY 6/26/2016 HEMOGLOBIN A1C Q6M 3/18/2018 FOOT EXAM Q1 5/17/2018 LIPID PANEL Q1 5/17/2018 MICROALBUMIN Q1 9/18/2018 COLONOSCOPY 1/3/2026 Allergies as of 12/14/2017  Review Complete On: 12/14/2017 By: Flavio Arguello LPN Severity Noted Reaction Type Reactions Seafood High 09/19/2016    Anaphylaxis Current Immunizations  Reviewed on 1/16/2017 No immunizations on file. Not reviewed this visit You Were Diagnosed With   
  
 Codes Comments Painful urination    -  Primary ICD-10-CM: R30.9 ICD-9-CM: 199. 1 Type 2 diabetes mellitus without complication, without long-term current use of insulin (HCC)     ICD-10-CM: E11.9 ICD-9-CM: 250.00 Vitals BP Pulse Temp Resp Height(growth percentile) Weight(growth percentile) 118/70 (BP 1 Location: Right arm, BP Patient Position: Sitting) 86 96.5 °F (35.8 °C) (Oral) 16 5' 4\" (1.626 m) 172 lb (78 kg) SpO2 BMI OB Status Smoking Status 96% 29.52 kg/m2 Postmenopausal Former Smoker Vitals History BMI and BSA Data Body Mass Index Body Surface Area  
 29.52 kg/m 2 1.88 m 2 Preferred Pharmacy Pharmacy Name Phone [facility] [address]  Fanny Sanches [phone] Your Updated Medication List  
  
   
This list is accurate as of: 12/14/17  9:10 AM.  Always use your most recent med list.  
  
  
  
  
 Claudine Clemente 250-50 mcg/dose diskus inhaler Generic drug:  fluticasone-salmeterol USE ONE INHALATION BY MOUTH TWICE A DAY  
  
 * albuterol 2.5 mg /3 mL (0.083 %) nebulizer solution Commonly known as:  PROVENTIL VENTOLIN  
3 mL by Nebulization route three (3) times daily as needed for Wheezing. * albuterol 90 mcg/actuation inhaler Commonly known as:  PROAIR HFA Take 2 Puffs by inhalation every four (4) hours as needed for Wheezing. * PROAIR HFA 90 mcg/actuation inhaler Generic drug:  albuterol INHALE 2 PUFFS BY MOUTH EVERY 6 HOURS AS NEEDED FOR WHEEZING Blood-Glucose Meter monitoring kit Check sugar QD  
  
 * glucose blood VI test strips strip Commonly known as:  ONETOUCH ULTRA TEST Check BS QD  
  
 * glucose blood VI test strips strip Commonly known as:  ASCENSIA AUTODISC VI, ONE TOUCH ULTRA TEST VI To test blood glucose daily Lancets Misc Check sugar QD Leg Brace Misc Commonly known as:  ACE KNEE BRACE  
by Does Not Apply route. Use on right knee QD  
  
 lisinopril-hydroCHLOROthiazide 20-25 mg per tablet Commonly known as:  PRINZIDE, ZESTORETIC  
TAKE 1 TABLET BY MOUTH EVERY DAY  
  
 metFORMIN 500 mg tablet Commonly known as:  GLUCOPHAGE  
TAKE 1 TABLET BY MOUTH TWICE DAILY WITH MEALS  
  
 mometasone 0.1 % ointment Commonly known as:  [place] Levels Apply  to affected area daily. montelukast 10 mg tablet Commonly known as:  SINGULAIR  
TAKE 1 TABLET BY MOUTH DAILY Nebulizer & Compressor machine Use TID PRN for wheezing Nebulizer Accessories Kit  
by Does Not Apply route. USE PRN WHEEZING  
  
 * Notice:   This list has 5 medication(s) that are the same as other medications prescribed for you. Read the directions carefully, and ask your doctor or other care provider to review them with you. Prescriptions Sent to Pharmacy Refills  
 glucose blood VI test strips (ASCENSIA AUTODISC VI, ONE TOUCH ULTRA TEST VI) strip 4 Sig: To test blood glucose daily Class: Normal  
 [facility] [address] #: [phone] We Performed the Following AMB POC URINALYSIS DIP STICK AUTO W/ MICRO  [48105 CPT(R)] CBC W/O DIFF [04999 CPT(R)] HEMOGLOBIN A1C WITH EAG [24711 CPT(R)] LIPID PANEL [60882 CPT(R)] METABOLIC PANEL, COMPREHENSIVE [54346 CPT(R)] VITAMIN D, 25 HYDROXY U534996 CPT(R)] Introducing [facility]! Dear Cherelle Branch: Thank you for requesting a Sp[employer]fly account. Our records indicate that you already have an active Spurfly account. You can access your account anytime at [email or web address] Did you know that you can access your hospital and ER discharge instructions at any time in Sp[employer]fly? You can also review all of your test results from your hospital stay or ER visit. Additional Information If you have questions, please visit the Frequently Asked Questions section of the Sp[employer]fly website at [email or web address] Remember, Spurfly is NOT to be used for urgent needs. For medical emergencies, dial 911. Now available from your iPhone and Android! Please provide this summary of care documentation to your next provider. Your primary care clinician is listed as Adilia Phillips. If you have any questions after today's visit, please call [phone].

## 2017-12-14 NOTE — PROGRESS NOTES
Subjective:     Chief Complaint   Patient presents with    Blood sugar problem     job work load--didn't take meds    Dysuria       History of Present Illness    Lou Edgar is a 60y.o. year old female who is a patient of Dr. Megha Chow that presents today with concerns over her BS. She reports BS readings as high as 248 over the last several weeks. She recently returned from [place]. She did not check her BS while in [place] but states she checked her BS when she got home on Sunday and it was 178. Her last A1c was 7.1 in September. She reports compliance with her Metformin. She admits to not adhereing to a diabetic diet. She states she is going to start walking daily. She states she has had an increase in urinary complaints over the past several weeks. She was recently on Cipro for UTI. U/A negative in office today. NAD. She denies any other new complaints today. No CP, SOB, GI, or  symptoms. Reviewed medications, recent lab work and imaging with patient. Pt reports compliance with medications. Current Outpatient Prescriptions on File Prior to Visit   Medication Sig Dispense Refill    metFORMIN (GLUCOPHAGE) 500 mg tablet TAKE 1 TABLET BY MOUTH TWICE DAILY WITH MEALS 180 Tab 0    ADVAIR DISKUS 250-50 mcg/dose diskus inhaler USE ONE INHALATION BY MOUTH TWICE A DAY 1 Inhaler 5    montelukast (SINGULAIR) 10 mg tablet TAKE 1 TABLET BY MOUTH DAILY 90 Tab 0    Leg Brace (ACE KNEE BRACE) misc by Does Not Apply route. Use on right knee QD 1 Each 0    PROAIR HFA 90 mcg/actuation inhaler INHALE 2 PUFFS BY MOUTH EVERY 6 HOURS AS NEEDED FOR WHEEZING 1 Inhaler 1    mometasone (ELOCON) 0.1 % ointment Apply  to affected area daily. 30 g 0    albuterol (PROAIR HFA) 90 mcg/actuation inhaler Take 2 Puffs by inhalation every four (4) hours as needed for Wheezing.  24 Inhaler 1    lisinopril-hydroCHLOROthiazide (PRINZIDE, ZESTORETIC) 20-25 mg per tablet TAKE 1 TABLET BY MOUTH EVERY DAY 90 Tab 1    glucose blood VI test strips (ASCENSIA AUTODISC VI, ONE TOUCH ULTRA TEST VI) strip To test blood glucose daily 50 Strip 4    albuterol (PROVENTIL VENTOLIN) 2.5 mg /3 mL (0.083 %) nebulizer solution 3 mL by Nebulization route three (3) times daily as needed for Wheezing. 100 Package 11    glucose blood VI test strips (ONE TOUCH ULTRA TEST) strip Check BS QD 50 strip 11    Blood-Glucose Meter monitoring kit Check sugar QD 1 Kit 0    Lancets misc Check sugar QD 1 Package 11    Nebulizer & Compressor machine Use TID PRN for wheezing 1 Each 0    Nebulizer Accessories Kit by Does Not Apply route. USE PRN WHEEZING 1 Kit 0     No current facility-administered medications on file prior to visit. Allergies   Allergen Reactions    Seafood Anaphylaxis      Past Medical History:   Diagnosis Date    Allergic rhinitis 10/24/2009    Asthma, mild intermittent, well-controlled 10/24/2009    HTN (hypertension) 10/24/2009    Mixed hyperlipidemia 10/24/2009    Mixed hyperlipidemia 10/24/2009    Plantar fasciitis 4/4/2011    Type II or unspecified type diabetes mellitus without mention of complication, not stated as uncontrolled 10/24/2009      History reviewed. No pertinent surgical history. Social History   Substance Use Topics    Smoking status: Former Smoker     Packs/day: 1.50     Types: Cigarettes     Quit date: 3/5/2008    Smokeless tobacco: Never Used    Alcohol use No      Family History   Problem Relation Age of Onset    Diabetes Mother     Diabetes Sister     Hypertension Sister     Cancer Neg Hx     Heart Disease Neg Hx     Stroke Neg Hx         Objective:     Vitals:    12/14/17 0846   BP: 118/70   Pulse: 86   Resp: 16   Temp: 96.5 °F (35.8 °C)   TempSrc: Oral   SpO2: 96%   Weight: 172 lb (78 kg)   Height: 5' 4\" (1.626 m)       Review of Systems   Constitutional: Negative. HENT: Negative. Eyes: Negative. Respiratory: Negative. Cardiovascular: Negative. Gastrointestinal: Negative. Genitourinary: Negative. Musculoskeletal: Negative. Skin: Negative. Psychiatric/Behavioral: Negative. Physical Exam   Constitutional: She is oriented to person, place, and time. She appears well-developed and well-nourished. No distress. Well-appearing AA female. NAD   HENT:   Head: Normocephalic and atraumatic. Eyes: Conjunctivae and EOM are normal. Pupils are equal, round, and reactive to light. Neck: Normal range of motion. Neck supple. Cardiovascular: Normal rate, regular rhythm and normal heart sounds. Pulmonary/Chest: Effort normal and breath sounds normal. No respiratory distress. Abdominal: She exhibits no distension. Musculoskeletal: Normal range of motion. She exhibits no edema, tenderness or deformity. Neurological: She is alert and oriented to person, place, and time. Skin: Skin is warm and dry. She is not diaphoretic. Psychiatric: She has a normal mood and affect. Her behavior is normal.   Nursing note and vitals reviewed. Assessment/ Plan:   Diagnoses and all orders for this visit:    1. Painful urination   Will order   -     AMB POC URINALYSIS DIP STICK AUTO W/ MICRO    Negative for UTI    2. Type 2 diabetes mellitus without complication, without long-term current use of insulin ([facility])   Will order   -     HEMOGLOBIN A1C WITH EAG  -     CBC W/O DIFF  -     METABOLIC PANEL, COMPREHENSIVE  -     LIPID PANEL  -     VITAMIN D, 25 HYDROXY  -     glucose blood VI test strips (ASCENSIA AUTODISC VI, ONE TOUCH ULTRA TEST VI) strip; To test blood glucose daily    Patient's plan of care has been reviewed with them. Patient and/or family have verbally conveyed their understanding and agreement of the patient's signs, symptoms, diagnosis, treatment and prognosis and additionally agree to follow up as recommended or return to [facility] should their condition change prior to follow-up.   Discharge instructions have also been provided to the patient with some educational information regarding their diagnosis as well a list of reasons why they would want to return to the office prior to their follow-up appointment should their condition change. Follow-up with Dr. Irasema Sneed as scheduled.

## 2017-12-14 NOTE — PROGRESS NOTES
Chief Complaint   Patient presents with    Blood sugar problem     job work load--didn't take meds    Dysuria     1. Have you been to the ER, urgent care clinic since your last visit? Hospitalized since your last visit? No    2. Have you seen or consulted any other health care providers outside of the [address] [place] [address] since your last visit? Include any pap smears or colon screening.  No

## 2017-12-15 LAB
25(OH)D3+25(OH)D2 SERPL-MCNC: 26.5 NG/ML (ref 30–100)
ALBUMIN SERPL-MCNC: 4.7 G/DL (ref 3.6–4.8)
ALBUMIN/GLOB SERPL: 1.6 {RATIO} (ref 1.2–2.2)
ALP SERPL-CCNC: 48 IU/L (ref 39–117)
ALT SERPL-CCNC: 18 IU/L (ref 0–32)
AST SERPL-CCNC: 17 IU/L (ref 0–40)
BILIRUB SERPL-MCNC: 0.4 MG/DL (ref 0–1.2)
BUN SERPL-MCNC: 15 MG/DL (ref 8–27)
BUN/CREAT SERPL: 15 (ref 12–28)
CALCIUM SERPL-MCNC: 9.8 MG/DL (ref 8.7–10.3)
CHLORIDE SERPL-SCNC: 100 MMOL/L (ref 96–106)
CHOLEST SERPL-MCNC: 223 MG/DL (ref 100–199)
CO2 SERPL-SCNC: 24 MMOL/L (ref 18–29)
CREAT SERPL-MCNC: 0.99 MG/DL (ref 0.57–1)
ERYTHROCYTE [DISTWIDTH] IN BLOOD BY AUTOMATED COUNT: 14.1 % (ref 12.3–15.4)
EST. AVERAGE GLUCOSE BLD GHB EST-MCNC: 163 MG/DL
GFR SERPLBLD CREATININE-BSD FMLA CKD-EPI: 61 ML/MIN/1.73
GFR SERPLBLD CREATININE-BSD FMLA CKD-EPI: 70 ML/MIN/1.73
GLOBULIN SER CALC-MCNC: 3 G/DL (ref 1.5–4.5)
GLUCOSE SERPL-MCNC: 130 MG/DL (ref 65–99)
HBA1C MFR BLD: 7.3 % (ref 4.8–5.6)
HCT VFR BLD AUTO: 41.5 % (ref 34–46.6)
HDLC SERPL-MCNC: 53 MG/DL
HGB BLD-MCNC: 13.8 G/DL (ref 11.1–15.9)
INTERPRETATION, 910389: NORMAL
LDLC SERPL CALC-MCNC: 149 MG/DL (ref 0–99)
Lab: NORMAL
MCH RBC QN AUTO: 29.3 PG (ref 26.6–33)
MCHC RBC AUTO-ENTMCNC: 33.3 G/DL (ref 31.5–35.7)
MCV RBC AUTO: 88 FL (ref 79–97)
PLATELET # BLD AUTO: 281 X10E3/UL (ref 150–379)
POTASSIUM SERPL-SCNC: 4.3 MMOL/L (ref 3.5–5.2)
PROT SERPL-MCNC: 7.7 G/DL (ref 6–8.5)
RBC # BLD AUTO: 4.71 X10E6/UL (ref 3.77–5.28)
SODIUM SERPL-SCNC: 143 MMOL/L (ref 134–144)
TRIGL SERPL-MCNC: 106 MG/DL (ref 0–149)
VLDLC SERPL CALC-MCNC: 21 MG/DL (ref 5–40)
WBC # BLD AUTO: 4.2 X10E3/UL (ref 3.4–10.8)

## 2017-12-15 RX ORDER — METFORMIN HYDROCHLORIDE 850 MG/1
850 TABLET ORAL 2 TIMES DAILY WITH MEALS
Qty: 60 TAB | Refills: 3 | Status: SHIPPED | OUTPATIENT
Start: 2017-12-15 | End: 2018-01-03

## 2017-12-15 NOTE — PROGRESS NOTES
1.  Hgb A1c, 7.3. Increase Metformin to 850 mg PO BID. Change sent to pharmacy. Encourage patient to eat a healther Mediterranean style diet with 55% or less of calories from carbohydrates. Try to eat more vegetables, whole fruit, nuts, whole grains, yogurt and less refined grains. Eat less red meat. Chicken and fish would be good protein sources. Aim to eat less than 45 grams of carbohydrates per meal.  2.  Total cholesterol and LDL are elevated. Recommend that patient watch diet for fatty foods and exercise as tolerated. 3. Low vit D. Adv to take OTC vit D 1000 unit po every day for 4 months. Other labs stable.

## 2018-01-03 ENCOUNTER — OFFICE VISIT (OUTPATIENT)
Dept: INTERNAL MEDICINE CLINIC | Age: 63
End: 2018-01-03

## 2018-01-03 VITALS
HEART RATE: 81 BPM | OXYGEN SATURATION: 96 % | HEIGHT: 64 IN | TEMPERATURE: 97.5 F | WEIGHT: 171 LBS | SYSTOLIC BLOOD PRESSURE: 156 MMHG | DIASTOLIC BLOOD PRESSURE: 94 MMHG | BODY MASS INDEX: 29.19 KG/M2

## 2018-01-03 DIAGNOSIS — J45.41 MODERATE PERSISTENT ASTHMA WITH EXACERBATION: ICD-10-CM

## 2018-01-03 DIAGNOSIS — I10 ESSENTIAL HYPERTENSION: ICD-10-CM

## 2018-01-03 DIAGNOSIS — J30.1 ALLERGIC RHINITIS DUE TO POLLEN, UNSPECIFIED CHRONICITY, UNSPECIFIED SEASONALITY: ICD-10-CM

## 2018-01-03 DIAGNOSIS — J45.40 MODERATE PERSISTENT ASTHMA, UNSPECIFIED WHETHER COMPLICATED: ICD-10-CM

## 2018-01-03 DIAGNOSIS — E11.9 TYPE 2 DIABETES MELLITUS WITHOUT COMPLICATION, WITHOUT LONG-TERM CURRENT USE OF INSULIN (HCC): Primary | ICD-10-CM

## 2018-01-03 RX ORDER — MONTELUKAST SODIUM 10 MG/1
10 TABLET ORAL DAILY
Qty: 90 TAB | Refills: 4 | Status: SHIPPED | OUTPATIENT
Start: 2018-01-03 | End: 2018-07-17 | Stop reason: SDUPTHER

## 2018-01-03 RX ORDER — METFORMIN HYDROCHLORIDE 500 MG/1
TABLET ORAL 2 TIMES DAILY WITH MEALS
COMMUNITY
End: 2018-07-23 | Stop reason: SDUPTHER

## 2018-01-03 RX ORDER — PREDNISONE 5 MG/1
TABLET ORAL
Qty: 21 TAB | Refills: 0 | Status: SHIPPED | OUTPATIENT
Start: 2018-01-03 | End: 2018-04-12

## 2018-01-03 NOTE — PATIENT INSTRUCTIONS
Asthma: Your Action Plan  Sample Action Plan    Controller medicine action plan  Fill in the blank spaces and boxes that apply for all sections. · Name of your controller medicine:  ¨ ____________________________________________  · How much of this medicine do you take? ¨ ____________________________________________  · How often do you take this medicine? ¨ ____________________________________________  · Other instructions? ¨ ____________________________________________  Quick-relief medicine action plan  · Name of your quick-relief medicine:  ¨ ____________________________________________  · How much of this medicine do you take? ¨ ____________________________________________  · How often do you take this medicine? ¨ ____________________________________________  Asthma Zones  GREEN ZONE: This is where you want to be! Green zone symptoms  · You have no shortness of breath or chest tightness. You are not coughing or wheezing. · You can do all of your usual activities. · You sleep well at night. Green zone peak flow (if you use a peak flow meter)  · ______ or more (80% or more of your personal best)  Green zone actions (Check the boxes and fill in the blank spaces that apply.)  [ ] You take your controller medicine(s) every day. [ ] Gavino Malagon are staying away from your asthma triggers. [ ] You take quick-relief medicine (called _____________________) ______ minutes before exercise. YELLOW ZONE: Your asthma is getting worse. Yellow zone symptoms  · You are short of breath or have chest tightness. You are coughing or wheezing. · You have symptoms that keep you up at night. · You can do some, but not all, of your usual activities. Yellow zone peak flow (if you use a peak flow meter)  · ______ to ______ (50% to 79% of your personal best)  Yellow zone actions (Check the boxes and fill in the blank spaces that apply.)  [ ] Take _____ puff(s) of quick-relief medicine called ______________________.  Repeat _____ times. [ ] If your symptoms don't get better or your peak flow has not returned to the green zone in 1 hour, then:  · [ ] Take _____ puff(s) of medicine called ______________________. Take it ____ times a day. · [ ] Begin or increase treatment with corticosteroid pills. Take ______ mg of medicine called ____________________________ every __________. · [ ] Call your doctor at this number: ____________________. RED ZONE: Danger! Red zone symptoms  · You are very short of breath. · You can't do your usual activities. · Quick-relief medicine doesn't help. Or your symptoms don't get better after 24 hours in the yellow zone. Red zone peak flow (if you use a peak flow meter)  · Less than _______ (less than 50% of your personal best)  Red zone actions (Check the boxes and fill in the blank spaces that apply.)  [ ] Take _____ puff(s) of quick-relief medicine called ____________________________. Repeat ______ times. [ ] Begin or increase treatment with corticosteroid pills. Take ________ mg now. [ ] Call your doctor at this number: _________________. If you can't contact your doctor, go to the emergency department. Call 911 or ___________________. [ ] Other numbers you might call are: ___________________________________. When should you call for help? Call 911 anytime you think you may need emergency care. For example, call if:  · You have severe trouble breathing. Call your doctor now or seek immediate medical care if:  · You are in the red zone of your asthma action plan. · You've used your quick-relief medicine but are still having trouble breathing. · You cough up blood. · You have new or worse trouble breathing. · You cough up dark brown or bloody mucus (sputum). Watch closely for changes in your health, and be sure to contact your doctor if:  · You need to use quick-relief medicine more than 2 days each week (unless it's just for exercise). · Your coughing and wheezing get worse.   Follow-up care is a key part of your treatment and safety. Be sure to make and go to all appointments, and call your doctor if you are having problems. It's also a good idea to know your test results and keep a list of the medicines you take. Where can you learn more? Go to http://sol-hardik.info/. Enter 15 [phone] in the search box to learn more about \"Asthma: Your Action Plan. \"  Current as of: May 12, 2017  Content Version: 11.4  © 1136-5182 [employer]. Care instructions adapted under license by [employer] (which disclaims liability or warranty for this information). If you have questions about a medical condition or this instruction, always ask your healthcare professional. [address] any warranty or liability for your use of this information.

## 2018-01-03 NOTE — PROGRESS NOTES
HISTORY OF PRESENT ILLNESS  Alba Mccarthy is a 60 y.o. female here with a complaint of cough and increased shortness of breath for last 5 days. She has asthma using Advair every day. Using inhaler a lot. She has stopped taking Singulair for last 6 month. Has nasal congestion and postnasal drip. No fever. Called on-call service, was told to take Mucinex DM. Allergy is worse. lot of post nasal drip. Labs done, A1c slightly elevated. He did take a new metformin dosage. Wanted to try diet and exercise. Have HTN,on meds. Blood pressure is elevated due to Mucinex DM. No chest pain palpitation or shortness of breath. Cough   Associated symptoms include shortness of breath. Pertinent negatives include no chest pain. Shortness of Breath   Associated symptoms include cough and wheezing. Pertinent negatives include no fever, no neck pain, no chest pain and no rash. Hypertension    Associated symptoms include shortness of breath. Pertinent negatives include no chest pain, no palpitations, no blurred vision, no neck pain and no nausea. Diabetes   Associated symptoms include shortness of breath. Pertinent negatives include no chest pain. Asthma   Associated symptoms include shortness of breath. Pertinent negatives include no chest pain. Review of Systems   Constitutional: Negative. Negative for chills and fever. HENT: Negative. Eyes: Negative. Negative for blurred vision and double vision. Respiratory: Positive for cough, shortness of breath and wheezing. Cardiovascular: Negative. Negative for chest pain and palpitations. Gastrointestinal: Negative. Negative for heartburn and nausea. Genitourinary: Negative. Negative for dysuria and urgency. Musculoskeletal: Negative. Negative for back pain, myalgias and neck pain. Skin: Negative. Negative for itching and rash. Psychiatric/Behavioral: Negative.         Physical Exam   Constitutional: She appears well-developed and well-nourished. No distress. HENT:   Head: Normocephalic and atraumatic. Right Ear: External ear normal.   Left Ear: External ear normal.   Mouth/Throat: Oropharynx is clear and moist. No oropharyngeal exudate. Nasal turbinates:red inflamed,NT    Cobble stoning present. Neck: Normal range of motion. Neck supple. No JVD present. No thyromegaly present. Cardiovascular: Normal rate, regular rhythm, normal heart sounds and intact distal pulses. Pulmonary/Chest: Effort normal and breath sounds normal. No respiratory distress. She has no wheezes. Musculoskeletal: She exhibits no edema or tenderness. Right knee:NT ROm OK. slightly swollen. Psychiatric: She has a normal mood and affect. Her behavior is normal.       ASSESSMENT and PLAN    Diagnoses and all orders for this visit:    1. Type 2 diabetes mellitus without complication, without long-term current use of insulin (HCC)    Her A1c is slightly elevated. On metformin 500 mg twice a day. Not willing to increase to 850 twice a day for now. She would like to try to watch diet and exercise more for now. Will continue 500 mg metformin twice a day for now and follow-up in 3 months. 2. Moderate persistent asthma, unspecified whether complicated    Advised not in control. She stopped taking Singulair. Wanted her to restart Singulair. Will refill,  -     montelukast (SINGULAIR) 10 mg tablet; Take 1 Tab by mouth daily. We will give,  -     predniSONE (STERAPRED) 5 mg dose pack; See administration instruction per 5mg dose pack    3. Essential hypertension    Slightly elevated due to decongestant. Need to take Prilosec every day. Be on DASH diet. 4. Allergic rhinitis due to pollen, unspecified chronicity, unspecified seasonality    May use Claritin 10 mg once a day as needed.   Will call in,  -     predniSONE (STERAPRED) 5 mg dose pack; See administration instruction per 5mg dose pack          Discussed expected course/resolution/complications of diagnosis in detail with patient. Medication risks/benefits/costs/interactions/alternatives discussed with patient. Pt was given an after visit summary which includes diagnoses, current medications & vitals. Pt expressed understanding with the diagnosis and plan.

## 2018-01-03 NOTE — MR AVS SNAPSHOT
Visit Information Date & Time Provider Department Dept. Phone Encounter #  
 1/3/2018 10:45 AM Anna Layne, [address] [facility] Internal Medicine [phone] [id number] Your Appointments 1/17/2018  8:15 AM  
ROUTINE CARE with Anna Layne MD  
[facility] [address]) Appt Note: 4 mo [address] [place] [id number]  
[phone]  
  
   
 [address] [facility] [address] Upcoming Health Maintenance Date Due Hepatitis C Screening 1955 Pneumococcal 19-64 Medium Risk (1 of 1 - PPSV23) 8/19/1973 DTaP/Tdap/Td series (1 - Tdap) 8/19/1975 ZOSTER VACCINE AGE 60> 6/19/2014 EYE EXAM RETINAL OR DILATED Q1 3/17/2016 PAP AKA CERVICAL CYTOLOGY 6/26/2016 BREAST CANCER SCRN MAMMOGRAM 12/28/2017 FOOT EXAM Q1 5/17/2018 HEMOGLOBIN A1C Q6M 6/14/2018 MICROALBUMIN Q1 9/18/2018 LIPID PANEL Q1 12/14/2018 COLONOSCOPY 1/3/2026 Allergies as of 1/3/2018  Review Complete On: 1/3/2018 By: Anna Layne MD  
  
 Severity Noted Reaction Type Reactions Seafood High 09/19/2016    Anaphylaxis Current Immunizations  Reviewed on 1/16/2017 No immunizations on file. Not reviewed this visit You Were Diagnosed With   
  
 Codes Comments Type 2 diabetes mellitus without complication, without long-term current use of insulin ([facility])    -  Primary ICD-10-CM: E11.9 ICD-9-CM: 250.00 Moderate persistent asthma, unspecified whether complicated     FJE-03-TA: J45.40 ICD-9-CM: 493.90 Essential hypertension     ICD-10-CM: I10 
ICD-9-CM: 401.9 Allergic rhinitis due to pollen, unspecified chronicity, unspecified seasonality     ICD-10-CM: J30.1 ICD-9-CM: 477.0 Moderate persistent asthma with exacerbation     ICD-10-CM: J45.41 
ICD-9-CM: 493.92 Vitals BP Pulse Temp Height(growth percentile) Weight(growth percentile) SpO2 (!) 156/94 (BP 1 Location: Left arm, BP Patient Position: Sitting) 81 97.5 °F (36.4 °C) (Oral) 5' 4\" (1.626 m) 171 lb (77.6 kg) 96% BMI OB Status Smoking Status 29.35 kg/m2 Postmenopausal Former Smoker Vitals History BMI and BSA Data Body Mass Index Body Surface Area  
 29.35 kg/m 2 1.87 m 2 Preferred Pharmacy [facility] Phone [facility]  [place] [phone] Your Updated Medication List  
  
   
This list is accurate as of: 1/3/18 11:35 AM.  Always use your most recent med list.  
  
  
  
  
 ADVAIR DISKUS 250-50 mcg/dose diskus inhaler Generic drug:  fluticasone-salmeterol USE ONE INHALATION BY MOUTH TWICE A DAY  
  
 * albuterol 2.5 mg /3 mL (0.083 %) nebulizer solution Commonly known as:  PROVENTIL VENTOLIN  
3 mL by Nebulization route three (3) times daily as needed for Wheezing. * albuterol 90 mcg/actuation inhaler Commonly known as:  PROAIR HFA Take 2 Puffs by inhalation every four (4) hours as needed for Wheezing. * PROAIR HFA 90 mcg/actuation inhaler Generic drug:  albuterol INHALE 2 PUFFS BY MOUTH EVERY 6 HOURS AS NEEDED FOR WHEEZING Blood-Glucose Meter monitoring kit Check sugar QD  
  
 * glucose blood VI test strips strip Commonly known as:  ONETOUCH ULTRA TEST Check BS QD  
  
 * glucose blood VI test strips strip Commonly known as:  ASCENSIA AUTODISC VI, ONE TOUCH ULTRA TEST VI To test blood glucose daily Lancets Misc Check sugar QD Leg Brace Misc Commonly known as:  ACE KNEE BRACE  
by Does Not Apply route. Use on right knee QD  
  
 lisinopril-hydroCHLOROthiazide 20-25 mg per tablet Commonly known as:  PRINZIDE, ZESTORETIC  
TAKE 1 TABLET BY MOUTH EVERY DAY  
  
 metFORMIN 500 mg tablet Commonly known as:  GLUCOPHAGE Take  by mouth two (2) times daily (with meals). mometasone 0.1 % ointment Commonly known as:  Miller Klippel Apply  to affected area daily. montelukast 10 mg tablet Commonly known as:  SINGULAIR Take 1 Tab by mouth daily. Nebulizer & Compressor machine Use TID PRN for wheezing Nebulizer Accessories Kit  
by Does Not Apply route. USE PRN WHEEZING  
  
 predniSONE 5 mg dose pack Commonly known as:  STERAPRED See administration instruction per 5mg dose pack * Notice: This list has 5 medication(s) that are the same as other medications prescribed for you. Read the directions carefully, and ask your doctor or other care provider to review them with you. Prescriptions Sent to Pharmacy Refills  
 montelukast (SINGULAIR) 10 mg tablet 4 Sig: Take 1 Tab by mouth daily. Class: Normal  
 Pharmacy: [id number] [address] Ph #: [phone] Route: Oral  
 predniSONE (STERAPRED) 5 mg dose pack 0 Sig: See administration instruction per 5mg dose pack Class: Normal  
 Pharmacy: [facility] [address] Ph #: [phone] Patient Instructions Asthma: Your Action Plan Sample Action Plan Controller medicine action plan Fill in the blank spaces and boxes that apply for all sections. · Name of your controller medicine: 
¨ ____________________________________________ · How much of this medicine do you take? ¨ ____________________________________________ · How often do you take this medicine? ¨ ____________________________________________ · Other instructions? ¨ ____________________________________________ Quick-relief medicine action plan · Name of your quick-relief medicine: 
¨ ____________________________________________ · How much of this medicine do you take? ¨ ____________________________________________ · How often do you take this medicine? ¨ ____________________________________________ Asthma Zones GREEN ZONE: This is where you want to be! Green zone symptoms · You have no shortness of breath or chest tightness. You are not coughing or wheezing. · You can do all of your usual activities. · You sleep well at night. Green zone peak flow (if you use a peak flow meter) · ______ or more (80% or more of your personal best) Green zone actions (Check the boxes and fill in the blank spaces that apply.) [ ] You take your controller medicine(s) every day. [ ] Elías Dasilva are staying away from your asthma triggers. [ ] You take quick-relief medicine (called _____________________) ______ minutes before exercise. YELLOW ZONE: Your asthma is getting worse. Yellow zone symptoms · You are short of breath or have chest tightness. You are coughing or wheezing. · You have symptoms that keep you up at night. · You can do some, but not all, of your usual activities. Yellow zone peak flow (if you use a peak flow meter) · ______ to ______ (50% to 79% of your personal best) Yellow zone actions (Check the boxes and fill in the blank spaces that apply.) [ ] Take _____ puff(s) of quick-relief medicine called ______________________. Repeat _____ times. [ ] If your symptoms don't get better or your peak flow has not returned to the green zone in 1 hour, then: · [ ] Take _____ puff(s) of medicine called ______________________. Take it ____ times a day. · [ ] Begin or increase treatment with corticosteroid pills. Take ______ mg of medicine called ____________________________ every __________. · [ ] Call your doctor at this number: ____________________. RED ZONE: Danger! Red zone symptoms · You are very short of breath. · You can't do your usual activities. · Quick-relief medicine doesn't help. Or your symptoms don't get better after 24 hours in the yellow zone. Red zone peak flow (if you use a peak flow meter) · Less than _______ (less than 50% of your personal best) Red zone actions (Check the boxes and fill in the blank spaces that apply.) [ ] Take _____ puff(s) of quick-relief medicine called ____________________________. Repeat ______ times. [ ] Begin or increase treatment with corticosteroid pills. Take ________ mg now. [ ] Call your doctor at this number: _________________. If you can't contact your doctor, go to the emergency department. Call 911 or ___________________. [ ] Other numbers you might call are: ___________________________________. When should you call for help? Call 911 anytime you think you may need emergency care. For example, call if: 
· You have severe trouble breathing. Call your doctor now or seek immediate medical care if: 
· You are in the red zone of your asthma action plan. · You've used your quick-relief medicine but are still having trouble breathing. · You cough up blood. · You have new or worse trouble breathing. · You cough up dark brown or bloody mucus (sputum). Watch closely for changes in your health, and be sure to contact your doctor if: 
· You need to use quick-relief medicine more than 2 days each week (unless it's just for exercise). · Your coughing and wheezing get worse. Follow-up care is a key part of your treatment and safety. Be sure to make and go to all appointments, and call your doctor if you are having problems. It's also a good idea to know your test results and keep a list of the medicines you take. Where can you learn more? Go to http://sol-hardik.info/. Enter [phone] in the search box to learn more about \"Asthma: Your Action Plan. \" Current as of: May 12, 2017 Content Version: 11.4 © 1935-7568 Healthwise, Incorporated. Care instructions adapted under license by [employer] (which disclaims liability or warranty for this information).  If you have questions about a medical condition or this instruction, always ask your healthcare professional. [address] any warranty or liability for your use of this information. Introducing \[facility] [place] [facility]\"" & HEALTH SERVICES! Dear Elmer Boone: Thank you for requesting a [employer] account. Our records indicate that you already have an active [employer] account. You can access your account anytime at https://Net Transmit & Receive. Evomail/Net Transmit & Receive Did you know that you can access your hospital and ER discharge instructions at any time in [employer]? You can also review all of your test results from your hospital stay or ER visit. Additional Information If you have questions, please visit the Frequently Asked Questions section of the [employer] website at [email or web address] Transmit & Receive. Ev[employer]/Net Transmit & Receive/. Remember, [employer] is NOT to be used for urgent needs. For medical emergencies, dial 911. Now available from your iPhone and Android! Please provide this summary of care documentation to your next provider. Your primary care clinician is listed as Zonia Mukherjee. If you have any questions after today's visit, please call [phone].

## 2018-01-03 NOTE — PROGRESS NOTES
Chief Complaint   Patient presents with    Cough     cough X 3 days    Shortness of Breath     1. Have you been to the ER, urgent care clinic since your last visit? Hospitalized since your last visit? No    2. Have you seen or consulted any other health care providers outside of the [address] [place] [address] since your last visit? Include any pap smears or colon screening.  No

## 2018-03-02 RX ORDER — LISINOPRIL AND HYDROCHLOROTHIAZIDE 20; 25 MG/1; MG/1
TABLET ORAL
Qty: 90 TAB | Refills: 1 | Status: SHIPPED | COMMUNITY
Start: 2018-03-02 | End: 2018-03-05 | Stop reason: SDUPTHER

## 2018-03-05 RX ORDER — LISINOPRIL AND HYDROCHLOROTHIAZIDE 20; 25 MG/1; MG/1
TABLET ORAL
Qty: 90 TAB | Refills: 1 | Status: SHIPPED | OUTPATIENT
Start: 2018-03-05 | End: 2018-07-17 | Stop reason: SDUPTHER

## 2018-03-24 DIAGNOSIS — E11.9 DIABETES MELLITUS TYPE 2, CONTROLLED (HCC): ICD-10-CM

## 2018-03-24 DIAGNOSIS — J45.20 ASTHMA, MILD INTERMITTENT, WELL-CONTROLLED: ICD-10-CM

## 2018-03-26 RX ORDER — ALBUTEROL SULFATE 90 UG/1
AEROSOL, METERED RESPIRATORY (INHALATION)
Qty: 1 INHALER | Refills: 1 | Status: SHIPPED | OUTPATIENT
Start: 2018-03-26 | End: 2018-04-12

## 2018-03-26 RX ORDER — METFORMIN HYDROCHLORIDE 500 MG/1
TABLET ORAL
Qty: 180 TAB | Refills: 0 | Status: SHIPPED | OUTPATIENT
Start: 2018-03-26 | End: 2018-06-07 | Stop reason: SDUPTHER

## 2018-04-12 ENCOUNTER — OFFICE VISIT (OUTPATIENT)
Dept: INTERNAL MEDICINE CLINIC | Age: 63
End: 2018-04-12

## 2018-04-12 VITALS
TEMPERATURE: 96.8 F | DIASTOLIC BLOOD PRESSURE: 93 MMHG | BODY MASS INDEX: 28.34 KG/M2 | OXYGEN SATURATION: 97 % | HEART RATE: 102 BPM | WEIGHT: 166 LBS | RESPIRATION RATE: 20 BRPM | HEIGHT: 64 IN | SYSTOLIC BLOOD PRESSURE: 145 MMHG

## 2018-04-12 DIAGNOSIS — E78.2 MIXED HYPERLIPIDEMIA: ICD-10-CM

## 2018-04-12 DIAGNOSIS — I10 ESSENTIAL HYPERTENSION: ICD-10-CM

## 2018-04-12 DIAGNOSIS — J45.40 MODERATE PERSISTENT ASTHMA, UNSPECIFIED WHETHER COMPLICATED: ICD-10-CM

## 2018-04-12 DIAGNOSIS — E11.9 TYPE 2 DIABETES MELLITUS WITHOUT COMPLICATION, WITHOUT LONG-TERM CURRENT USE OF INSULIN (HCC): Primary | ICD-10-CM

## 2018-04-12 DIAGNOSIS — Z11.59 NEED FOR HEPATITIS C SCREENING TEST: ICD-10-CM

## 2018-04-12 NOTE — MR AVS SNAPSHOT
[address] [facility] [address] [place] [address] 
[phone] Patient: Evangelina Ramos MRN:  [id number] Visit Information Date & Time Provider Department Dept. Phone Encounter #  
 4/12/2018  9:30 AM Linda Shpiman MD [facility] Internal Medicine [phone] [id number] Upcoming Health Maintenance Date Due Hepatitis C Screening 1955 Pneumococcal 19-64 Medium Risk (1 of 1 - PPSV23) 8/19/1973 DTaP/Tdap/Td series (1 - Tdap) 8/19/1975 ZOSTER VACCINE AGE 60> 6/19/2014 EYE EXAM RETINAL OR DILATED Q1 3/17/2016 PAP AKA CERVICAL CYTOLOGY 6/26/2016 BREAST CANCER SCRN MAMMOGRAM 12/28/2017 HEMOGLOBIN A1C Q6M 6/14/2018 MICROALBUMIN Q1 9/18/2018 LIPID PANEL Q1 12/14/2018 FOOT EXAM Q1 4/12/2019 COLONOSCOPY 1/3/2026 Allergies as of 4/12/2018  Review Complete On: 4/12/2018 By: Linda Shipman MD  
  
 Severity Noted Reaction Type Reactions Seafood High 09/19/2016    Anaphylaxis Current Immunizations  Reviewed on 1/16/2017 No immunizations on file. Not reviewed this visit You Were Diagnosed With   
  
 Codes Comments Type 2 diabetes mellitus without complication, without long-term current use of insulin (HCC)    -  Primary ICD-10-CM: E11.9 ICD-9-CM: 250.00 Essential hypertension     ICD-10-CM: I10 
ICD-9-CM: 401.9 Mixed hyperlipidemia     ICD-10-CM: E78.2 ICD-9-CM: 272.2 Moderate persistent asthma, unspecified whether complicated     GYM-05-PE: J45.40 ICD-9-CM: 493.90 Need for hepatitis C screening test     ICD-10-CM: Z11.59 
ICD-9-CM: V73.89 Vitals BP Pulse Temp Resp Height(growth percentile) Weight(growth percentile) (!) 145/93 (BP 1 Location: Left arm, BP Patient Position: Sitting) (!) 102 96.8 °F (36 °C) (Oral) 20 5' 4\" (1.626 m) 166 lb (75.3 kg) SpO2 BMI OB Status Smoking Status 97% 28.49 kg/m2 Postmenopausal Former Smoker Vitals History BMI and BSA Data Body Mass Index Body Surface Area  
 28.49 kg/m 2 1.84 m 2 Preferred Pharmacy Pharmacy Name Phone [facility] [address] [facility]  [place] [phone] Your Updated Medication List  
  
   
This list is accurate as of 4/12/18 10:13 AM.  Always use your most recent med list.  
  
  
  
  
 Avant Chime 250-50 mcg/dose diskus inhaler Generic drug:  fluticasone-salmeterol USE ONE INHALATION BY MOUTH TWICE A DAY  
  
 * albuterol 2.5 mg /3 mL (0.083 %) nebulizer solution Commonly known as:  PROVENTIL VENTOLIN  
3 mL by Nebulization route three (3) times daily as needed for Wheezing. * PROAIR HFA 90 mcg/actuation inhaler Generic drug:  albuterol INHALE 2 PUFFS BY MOUTH EVERY 6 HOURS AS NEEDED FOR WHEEZING Blood-Glucose Meter monitoring kit Check sugar QD  
  
 glucose blood VI test strips strip Commonly known as:  ASCENSIA AUTODISC VI, ONE TOUCH ULTRA TEST VI To test blood glucose daily Lancets Misc Check sugar QD Leg Brace Misc Commonly known as:  ACE KNEE BRACE  
by Does Not Apply route. Use on right knee QD  
  
 lisinopril-hydroCHLOROthiazide 20-25 mg per tablet Commonly known as:  PRINZIDE, ZESTORETIC  
TAKE 1 TABLET BY MOUTH EVERY DAY  
  
 * metFORMIN 500 mg tablet Commonly known as:  GLUCOPHAGE Take  by mouth two (2) times daily (with meals). * metFORMIN 500 mg tablet Commonly known as:  GLUCOPHAGE  
TAKE 1 TABLET BY MOUTH TWICE DAILY WITH MEALS  
  
 mometasone 0.1 % ointment Commonly known as:  Raenelle Records Apply  to affected area daily. montelukast 10 mg tablet Commonly known as:  SINGULAIR Take 1 Tab by mouth daily. Nebulizer & Compressor machine Use TID PRN for wheezing Nebulizer Accessories Kit  
by Does Not Apply route.  USE PRN WHEEZING  
  
 * Notice: This list has 4 medication(s) that are the same as other medications prescribed for you. Read the directions carefully, and ask your doctor or other care provider to review them with you. We Performed the Following HEMOGLOBIN A1C WITH EAG [50620 CPT(R)] HEPATITIS C AB [14622 CPT(R)] LIPID PANEL [95665 CPT(R)] METABOLIC PANEL, COMPREHENSIVE [39426 CPT(R)] MICROALBUMIN, UR, RAND [id number] CPT(R)] Introducing [facility] SERVICES! Dear Kinsey Hays: Thank you for requesting a [employer] account. Our records indicate that you already have an active [employer] account. You can access your account anytime at [email or web address] Did you know that you can access your hospital and ER discharge instructions at any time in [employer]? You can also review all of your test results from your hospital stay or ER visit. Additional Information If you have questions, please visit the Frequently Asked Questions section of the [employer] website at [email or web address] Remember, [employer] is NOT to be used for urgent needs. For medical emergencies, dial 911. Now available from your iPhone and Android! Please provide this summary of care documentation to your next provider. Your primary care clinician is listed as Gui Landers. If you have any questions after today's visit, please call [phone].

## 2018-04-12 NOTE — PROGRESS NOTES
HISTORY OF PRESENT ILLNESS  Alba Rhodes is a 60 y.o. female here for follow up. Has asthma on inhaler. Doing well. She is seen for diabetes. He reports medication compliance: compliant most of the time. Diabetic diet compliance: compliant most of the time. Home glucose monitoring: is not performed. Further diabetic ROS: no polyuria or polydipsia, no chest pain, dyspnea or TIA's, no numbness, tingling or pain in extremities, no hypoglycemia. Lab review: labs reviewed and discussed with patient. Eye exam: up to date. Have HTN,on meds. Labs reviewed. Cholesterol Problem   Pertinent negatives include no chest pain. Diabetes   Pertinent negatives include no chest pain. Hypertension    Pertinent negatives include no chest pain, no palpitations, no blurred vision, no neck pain and no nausea. Asthma   Pertinent negatives include no chest pain. Review of Systems   Constitutional: Negative. Negative for chills and fever. HENT: Negative. Eyes: Negative. Negative for blurred vision and double vision. Respiratory: Negative. Cardiovascular: Negative. Negative for chest pain and palpitations. Gastrointestinal: Negative. Negative for heartburn and nausea. Genitourinary: Negative. Negative for dysuria and urgency. Musculoskeletal: Positive for joint pain. Negative for back pain, myalgias and neck pain. Skin: Negative. Negative for itching and rash. Psychiatric/Behavioral: Negative. Physical Exam   Constitutional: She appears well-developed and well-nourished. No distress. HENT:        Neck: Normal range of motion. Neck supple. No JVD present. No thyromegaly present. Cardiovascular: Normal rate, regular rhythm, normal heart sounds and intact distal pulses. Pulmonary/Chest: Effort normal and breath sounds normal. No respiratory distress. She has no wheezes. Musculoskeletal: She exhibits no edema or tenderness.      Diabetic foot exam:     Left: Reflexes 2+ Vibratory sensation normal    Proprioception normal   Sharp/dull discrimination normal    Filament test normal sensation with micro filament   Pulse DP: 2+ (normal)   Pulse PT: 2+ (normal)   Deformities: None  Right: Reflexes 2+   Vibratory sensation normal   Proprioception normal   Sharp/dull discrimination normal   Filament test normal sensation with micro filament   Pulse DP: 2+ (normal)   Pulse PT: 2+ (normal)   Deformities: None   Psychiatric: She has a normal mood and affect. Her behavior is normal.   anxious       ASSESSMENT and PLAN    Diagnoses and all orders for this visit:    1. Type 2 diabetes mellitus without complication, without long-term current use of insulin (HCC)    Stable with current regimen. Will check,  -     HEMOGLOBIN A1C WITH EAG  -     METABOLIC PANEL, COMPREHENSIVE  -     MICROALBUMIN, UR, RAND    2. Essential hypertension     on Prinzide. Will check,      -     METABOLIC PANEL, COMPREHENSIVE    3. Mixed hyperlipidemia  We will check,    -     LIPID PANEL  -     METABOLIC PANEL, COMPREHENSIVE    4. Moderate persistent asthma, unspecified whether complicated  On nebulizer and inhaler. Stable. 5. Need for hepatitis C screening test  -     HEPATITIS C AB          Discussed expected course/resolution/complications of diagnosis in detail with patient. Medication risks/benefits/costs/interactions/alternatives discussed with patient. Pt was given an after visit summary which includes diagnoses, current medications & vitals. Pt expressed understanding with the diagnosis and plan.

## 2018-04-12 NOTE — LETTER
4/16/2018 10:14 AM 
 
Ms. Leonora Champion [address] [facility] [address] [address] Dear Leonora Champion: Please find your most recent results below. Resulted Orders LIPID PANEL Result Value Ref Range Cholesterol, total 223 (H) 100 - 199 mg/dL Triglyceride 118 0 - 149 mg/dL HDL Cholesterol 59 >39 mg/dL VLDL, calculated 24 5 - 40 mg/dL LDL, calculated 140 (H) 0 - 99 mg/dL Narrative Performed at:  [address]  [id number] : Tory Krishna MD, Phone:  [phone] HEMOGLOBIN A1C WITH EAG Result Value Ref Range Hemoglobin A1c 6.8 (H) 4.8 - 5.6 % Comment:  
            Pre-diabetes: 5.7 - 6.4 Diabetes: >6.4 Glycemic control for adults with diabetes: <7.0 Estimated average glucose 148 mg/dL Narrative Performed at:  [address]  [id number] : Tory Krishna MD, Phone:  [phone] METABOLIC PANEL, COMPREHENSIVE Result Value Ref Range Glucose 116 (H) 65 - 99 mg/dL BUN 15 8 - 27 mg/dL Creatinine 0.97 0.57 - 1.00 mg/dL GFR est non-AA 62 >59 mL/min/1.73 GFR est AA 72 >59 mL/min/1.73  
 BUN/Creatinine ratio 15 12 - 28 Sodium 141 134 - 144 mmol/L Potassium 4.1 3.5 - 5.2 mmol/L Chloride 98 96 - 106 mmol/L  
 CO2 26 18 - 29 mmol/L Calcium 9.6 8.7 - 10.3 mg/dL Protein, total 7.8 6.0 - 8.5 g/dL Albumin 4.8 3.6 - 4.8 g/dL GLOBULIN, TOTAL 3.0 1.5 - 4.5 g/dL A-G Ratio 1.6 1.2 - 2.2 Bilirubin, total 0.5 0.0 - 1.2 mg/dL Alk. phosphatase 45 39 - 117 IU/L  
 AST (SGOT) 18 0 - 40 IU/L  
 ALT (SGPT) 14 0 - 32 IU/L Narrative Performed at:  [address]  [id number] : Tory Krishna MD, Phone:  [phone] MICROALBUMIN, UR, RAND Result Value Ref Range Microalbumin, urine 8.8 Not Estab. ug/mL Narrative Performed at:  [address]  [id number] : Tory Krishna MD, Phone:  [phone] HEPATITIS C AB Result Value Ref Range Hep C Virus Ab <0.1 0.0 - 0.9 s/co ratio Comment:  
                                     Negative:     < 0.8 Indeterminate: 0.8 - 0.9 Positive:     > 0.9 The CDC recommends that a positive HCV antibody result 
 be followed up with a HCV Nucleic Acid Amplification 
 test (130053). Narrative Performed at:  [address]  [id number] : Tory Krishna MD, Phone:  [phone] CVD REPORT Result Value Ref Range INTERPRETATION Note Comment:  
   Supplemental report is available. Narrative Performed at:  [address] [place] [address] [address] [place] [address]  [id number] : Cindy Burkett PhD, Phone:  [phone] DIABETES PATIENT EDUCATION Result Value Ref Range PDF Image Not applicable Narrative Performed at:  [address] [place] [address] [address] [place] [address]  [id number] : Cindy Burkett PhD, Phone:  [phone] RECOMMENDATIONS: 
Leonora Champion your labs indicate that you are pre diabetes. Eating healthier- a Mediterranean style diet with 55% or less of calories from carbohydrates has been shown to be very helpful for people with pre-diabetes. Try to eat more vegetables, whole fruit, nuts, whole grains, yogurt and less refined grains. Eat less red meat. Chicken and fish would be good protein sources. Aim to eat less than 45 grams of carbohydrates per meal. [email or web address] has a nice review. Also,  your total cholesterol and LDL are elevated, please be on a low fat/cholesterol diet and exercise as tolerated. All other labs are stable Please call me if you have any questions: [phone] Sincerely, 
 Chidi Wood MD

## 2018-04-13 LAB
ALBUMIN SERPL-MCNC: 4.8 G/DL (ref 3.6–4.8)
ALBUMIN/GLOB SERPL: 1.6 {RATIO} (ref 1.2–2.2)
ALP SERPL-CCNC: 45 IU/L (ref 39–117)
ALT SERPL-CCNC: 14 IU/L (ref 0–32)
AST SERPL-CCNC: 18 IU/L (ref 0–40)
BILIRUB SERPL-MCNC: 0.5 MG/DL (ref 0–1.2)
BUN SERPL-MCNC: 15 MG/DL (ref 8–27)
BUN/CREAT SERPL: 15 (ref 12–28)
CALCIUM SERPL-MCNC: 9.6 MG/DL (ref 8.7–10.3)
CHLORIDE SERPL-SCNC: 98 MMOL/L (ref 96–106)
CHOLEST SERPL-MCNC: 223 MG/DL (ref 100–199)
CO2 SERPL-SCNC: 26 MMOL/L (ref 18–29)
CREAT SERPL-MCNC: 0.97 MG/DL (ref 0.57–1)
EST. AVERAGE GLUCOSE BLD GHB EST-MCNC: 148 MG/DL
GFR SERPLBLD CREATININE-BSD FMLA CKD-EPI: 62 ML/MIN/1.73
GFR SERPLBLD CREATININE-BSD FMLA CKD-EPI: 72 ML/MIN/1.73
GLOBULIN SER CALC-MCNC: 3 G/DL (ref 1.5–4.5)
GLUCOSE SERPL-MCNC: 116 MG/DL (ref 65–99)
HBA1C MFR BLD: 6.8 % (ref 4.8–5.6)
HCV AB S/CO SERPL IA: <0.1 S/CO RATIO (ref 0–0.9)
HDLC SERPL-MCNC: 59 MG/DL
INTERPRETATION, 910389: NORMAL
LDLC SERPL CALC-MCNC: 140 MG/DL (ref 0–99)
Lab: NORMAL
MICROALBUMIN UR-MCNC: 8.8 UG/ML
POTASSIUM SERPL-SCNC: 4.1 MMOL/L (ref 3.5–5.2)
PROT SERPL-MCNC: 7.8 G/DL (ref 6–8.5)
SODIUM SERPL-SCNC: 141 MMOL/L (ref 134–144)
TRIGL SERPL-MCNC: 118 MG/DL (ref 0–149)
VLDLC SERPL CALC-MCNC: 24 MG/DL (ref 5–40)

## 2018-04-13 NOTE — PROGRESS NOTES
1.  Total cholesterol and LDL elevated. Recommend that patient watch diet for fatty foods and exercise as tolerated. 2.  Hemoglobin A1c 6.8, prediabetic range. Encourage patient to eat a healther Mediterranean style diet with 55% or less of calories from carbohydrates. Try to eat more vegetables, whole fruit, nuts, whole grains, yogurt and less refined grains. Eat less red meat. Chicken and fish would be good protein sources. Aim to eat less than 45 grams of carbohydrates per meal.  Other labs stable.

## 2018-05-29 DIAGNOSIS — J45.20 ASTHMA, MILD INTERMITTENT, WELL-CONTROLLED: ICD-10-CM

## 2018-05-30 RX ORDER — ALBUTEROL SULFATE 90 UG/1
AEROSOL, METERED RESPIRATORY (INHALATION)
Qty: 1 INHALER | Refills: 2 | Status: SHIPPED | OUTPATIENT
Start: 2018-05-30 | End: 2018-07-23 | Stop reason: SDUPTHER

## 2018-06-07 DIAGNOSIS — E11.9 DIABETES MELLITUS TYPE 2, CONTROLLED (HCC): ICD-10-CM

## 2018-06-07 RX ORDER — METFORMIN HYDROCHLORIDE 500 MG/1
TABLET ORAL
Qty: 180 TAB | Refills: 0 | Status: SHIPPED | OUTPATIENT
Start: 2018-06-07 | End: 2018-07-20 | Stop reason: SDUPTHER

## 2018-07-17 DIAGNOSIS — J45.20 ASTHMA, MILD INTERMITTENT, WELL-CONTROLLED: ICD-10-CM

## 2018-07-17 DIAGNOSIS — E11.9 TYPE 2 DIABETES MELLITUS WITHOUT COMPLICATION, WITHOUT LONG-TERM CURRENT USE OF INSULIN (HCC): ICD-10-CM

## 2018-07-17 DIAGNOSIS — J45.40 MODERATE PERSISTENT ASTHMA, UNSPECIFIED WHETHER COMPLICATED: ICD-10-CM

## 2018-07-17 RX ORDER — MONTELUKAST SODIUM 10 MG/1
10 TABLET ORAL DAILY
Qty: 90 TAB | Refills: 4 | Status: SHIPPED | OUTPATIENT
Start: 2018-07-17

## 2018-07-17 RX ORDER — LISINOPRIL AND HYDROCHLOROTHIAZIDE 20; 25 MG/1; MG/1
TABLET ORAL
Qty: 90 TAB | Refills: 1 | Status: SHIPPED | OUTPATIENT
Start: 2018-07-17

## 2018-07-23 DIAGNOSIS — J45.20 ASTHMA, MILD INTERMITTENT, WELL-CONTROLLED: ICD-10-CM

## 2018-07-23 DIAGNOSIS — J45.40 MODERATE PERSISTENT ASTHMA WITHOUT COMPLICATION: ICD-10-CM

## 2018-07-23 DIAGNOSIS — E11.9 TYPE 2 DIABETES MELLITUS WITHOUT COMPLICATION, WITHOUT LONG-TERM CURRENT USE OF INSULIN (HCC): ICD-10-CM

## 2018-07-23 RX ORDER — METFORMIN HYDROCHLORIDE 500 MG/1
TABLET ORAL
Qty: 180 TAB | Refills: 0 | Status: SHIPPED | OUTPATIENT
Start: 2018-07-23

## 2018-07-24 RX ORDER — ALBUTEROL SULFATE 90 UG/1
AEROSOL, METERED RESPIRATORY (INHALATION)
Qty: 1 INHALER | Refills: 2 | Status: SHIPPED | OUTPATIENT
Start: 2018-07-24

## 2018-07-24 RX ORDER — FLUTICASONE PROPIONATE AND SALMETEROL 250; 50 UG/1; UG/1
POWDER RESPIRATORY (INHALATION)
Qty: 1 INHALER | Refills: 5 | Status: SHIPPED | OUTPATIENT
Start: 2018-07-24

## 2018-07-24 RX ORDER — METFORMIN HYDROCHLORIDE 500 MG/1
500 TABLET ORAL 2 TIMES DAILY WITH MEALS
Qty: 180 TAB | Refills: 2 | Status: SHIPPED | OUTPATIENT
Start: 2018-07-24 | End: 2018-09-18 | Stop reason: SDUPTHER

## 2018-08-09 ENCOUNTER — OFFICE VISIT (OUTPATIENT)
Dept: INTERNAL MEDICINE CLINIC | Age: 63
End: 2018-08-09

## 2018-08-09 VITALS
TEMPERATURE: 98.2 F | SYSTOLIC BLOOD PRESSURE: 146 MMHG | OXYGEN SATURATION: 97 % | BODY MASS INDEX: 29.88 KG/M2 | HEART RATE: 93 BPM | DIASTOLIC BLOOD PRESSURE: 76 MMHG | HEIGHT: 64 IN | WEIGHT: 175 LBS | RESPIRATION RATE: 16 BRPM

## 2018-08-09 DIAGNOSIS — I10 ESSENTIAL HYPERTENSION: Primary | ICD-10-CM

## 2018-08-09 DIAGNOSIS — Z78.0 MENOPAUSE: ICD-10-CM

## 2018-08-09 DIAGNOSIS — E78.2 MIXED HYPERLIPIDEMIA: ICD-10-CM

## 2018-08-09 DIAGNOSIS — Z23 ENCOUNTER FOR IMMUNIZATION: ICD-10-CM

## 2018-08-09 DIAGNOSIS — E11.9 TYPE 2 DIABETES MELLITUS WITHOUT COMPLICATION, WITHOUT LONG-TERM CURRENT USE OF INSULIN (HCC): ICD-10-CM

## 2018-08-09 NOTE — PROGRESS NOTES
Bar Albarado is a 60 y.o. female  who presents for routine immunizations. She denies any symptoms , reactions or allergies that would exclude them from being immunized today. Risks and adverse reactions were discussed and the VIS was given to them. All questions were addressed. She was observed for 10 min post injection. There were no reactions observed.     Tyler Lopez LPN

## 2018-08-09 NOTE — MR AVS SNAPSHOT
[address]00 [facility]  [address] [place] [address] 
[phone] Patient: Svetlana Rutherford MRN:  [id number] Visit Information Date & Time Provider Department Dept. Phone Encounter #  
 8/9/2018  8:30 AM Kai Cisse, [facility] [phone] [id number] Upcoming Health Maintenance Date Due Pneumococcal 19-64 Medium Risk (1 of 1 - PPSV23) 8/19/1973 DTaP/Tdap/Td series (1 - Tdap) 8/19/1975 ZOSTER VACCINE AGE 60> 6/19/2014 EYE EXAM RETINAL OR DILATED Q1 3/17/2016 PAP AKA CERVICAL CYTOLOGY 6/26/2016 BREAST CANCER SCRN MAMMOGRAM 12/28/2017 Influenza Age 5 to Adult 8/1/2018 HEMOGLOBIN A1C Q6M 10/12/2018 FOOT EXAM Q1 4/12/2019 MICROALBUMIN Q1 4/12/2019 LIPID PANEL Q1 4/12/2019 COLONOSCOPY 1/3/2026 Allergies as of 8/9/2018  Review Complete On: 8/9/2018 By: Kai Cisse MD  
  
 Severity Noted Reaction Type Reactions Seafood High 09/19/2016    Anaphylaxis Current Immunizations  Reviewed on 8/9/2018 Name Date Pneumococcal Conjugate (PCV-13)  Incomplete Reviewed by Kai Cisse MD on 8/9/2018 at  9:10 AM  
You Were Diagnosed With   
  
 Codes Comments Essential hypertension    -  Primary ICD-10-CM: I10 
ICD-9-CM: 401.9 Type 2 diabetes mellitus without complication, without long-term current use of insulin (HCC)     ICD-10-CM: E11.9 ICD-9-CM: 250.00 Mixed hyperlipidemia     ICD-10-CM: E78.2 ICD-9-CM: 272.2 Menopause     ICD-10-CM: Z78.0 ICD-9-CM: 627.2 Encounter for immunization     ICD-10-CM: G43 ICD-9-CM: V03.89 Vitals BP Pulse Temp Resp Height(growth percentile) Weight(growth percentile) 146/76 (BP 1 Location: Right arm, BP Patient Position: Sitting) 93 98.2 °F (36.8 °C) (Oral) 16 5' 4\" (1.626 m) 175 lb (79.4 kg) SpO2 BMI OB Status Smoking Status 97% 30.04 kg/m2 Postmenopausal Former Smoker Vitals History BMI and BSA Data Body Mass Index Body Surface Area 30.04 kg/m 2 1.89 m 2 Preferred Pharmacy Pharmacy Name Phone [address], [address] Laquita Turpin [phone] Your Updated Medication List  
  
   
This list is accurate as of 8/9/18  9:13 AM.  Always use your most recent med list.  
  
  
  
  
 * albuterol 2.5 mg /3 mL (0.083 %) nebulizer solution Commonly known as:  PROVENTIL VENTOLIN  
3 mL by Nebulization route three (3) times daily as needed for Wheezing. * PROAIR HFA 90 mcg/actuation inhaler Generic drug:  albuterol INHALE 2 PUFFS BY MOUTH EVERY 6 HOURS AS NEEDED FOR WHEEZING  
  
 * albuterol 90 mcg/actuation inhaler Commonly known as:  PROAIR HFA INHALE 2 PUFFS BY MOUTH EVERY 4 HOURS AS NEEDED WHEEZING Blood-Glucose Meter monitoring kit Check sugar QD  
  
 fluticasone-salmeterol 250-50 mcg/dose diskus inhaler Commonly known as:  ADVAIR DISKUS  
USE ONE INHALATION BY MOUTH TWICE A DAY  
  
 glucose blood VI test strips strip Commonly known as:  ASCENSIA AUTODISC VI, ONE TOUCH ULTRA TEST VI To test blood glucose daily Lancets Misc Check sugar QD Leg Brace Misc Commonly known as:  ACE KNEE BRACE  
by Does Not Apply route. Use on right knee QD  
  
 lisinopril-hydroCHLOROthiazide 20-25 mg per tablet Commonly known as:  PRINZIDE, ZESTORETIC  
TAKE 1 TABLET BY MOUTH EVERY DAY  
  
 * metFORMIN 500 mg tablet Commonly known as:  GLUCOPHAGE  
TAKE 1 TABLET BY MOUTH TWICE DAILY WITH MEALS  
  
 * metFORMIN 500 mg tablet Commonly known as:  GLUCOPHAGE Take 1 Tab by mouth two (2) times daily (with meals). mometasone 0.1 % ointment Commonly known as:  [place] Apply  to affected area daily. montelukast 10 mg tablet Commonly known as:  SINGULAIR Take 1 Tab by mouth daily. Nebulizer & Compressor machine Use TID PRN for wheezing Nebulizer Accessories Kit by Does Not Apply route. USE PRN WHEEZING  
  
 * Notice: This list has 5 medication(s) that are the same as other medications prescribed for you. Read the directions carefully, and ask your doctor or other care provider to review them with you. We Performed the Following HEMOGLOBIN A1C WITH EAG [23654 CPT(R)] LIPID PANEL [60289 CPT(R)] METABOLIC PANEL, COMPREHENSIVE [64683 CPT(R)] PNEUMOCOCCAL CONJ VACCINE 13 VALENT IM [id number] CPT(R)] To-Do List   
 11/09/2018 Imaging:  DEXA BONE DENSITY STUDY AXIAL Referral Information Referral ID Referred By Referred To  
  
 [id number] Neil Waite Not Available Visits Status Start Date End Date 1 New Request 8/9/18 8/9/19 If your referral has a status of pending review or denied, additional information will be sent to support the outcome of this decision. Patient Instructions DASH Diet: Care Instructions Your Care Instructions The DASH diet is an eating plan that can help lower your blood pressure. DASH stands for Dietary Approaches to Stop Hypertension. Hypertension is high blood pressure. The DASH diet focuses on eating foods that are high in calcium, potassium, and magnesium. These nutrients can lower blood pressure. The foods that are highest in these nutrients are fruits, vegetables, low-fat dairy products, nuts, seeds, and legumes. But taking calcium, potassium, and magnesium supplements instead of eating foods that are high in those nutrients does not have the same effect. The DASH diet also includes whole grains, fish, and poultry. The DASH diet is one of several lifestyle changes your doctor may recommend to lower your high blood pressure. Your doctor may also want you to decrease the amount of sodium in your diet. Lowering sodium while following the DASH diet can lower blood pressure even further than just the DASH diet alone. Follow-up care is a key part of your treatment and safety. Be sure to make and go to all appointments, and call your doctor if you are having problems. It's also a good idea to know your test results and keep a list of the medicines you take. How can you care for yourself at home? Following the DASH diet · Eat 4 to 5 servings of fruit each day. A serving is 1 medium-sized piece of fruit, ½ cup chopped or canned fruit, 1/4 cup dried fruit, or 4 ounces (½ cup) of fruit juice. Choose fruit more often than fruit juice. · Eat 4 to 5 servings of vegetables each day. A serving is 1 cup of lettuce or raw leafy vegetables, ½ cup of chopped or cooked vegetables, or 4 ounces (½ cup) of vegetable juice. Choose vegetables more often than vegetable juice. · Get 2 to 3 servings of low-fat and fat-free dairy each day. A serving is 8 ounces of milk, 1 cup of yogurt, or 1 ½ ounces of cheese. · Eat 6 to 8 servings of grains each day. A serving is 1 slice of bread, 1 ounce of dry cereal, or ½ cup of cooked rice, pasta, or cooked cereal. Try to choose whole-grain products as much as possible. · Limit lean meat, poultry, and fish to 2 servings each day. A serving is 3 ounces, about the size of a deck of cards. · Eat 4 to 5 servings of nuts, seeds, and legumes (cooked dried beans, lentils, and split peas) each week. A serving is 1/3 cup of nuts, 2 tablespoons of seeds, or ½ cup of cooked beans or peas. · Limit fats and oils to 2 to 3 servings each day. A serving is 1 teaspoon of vegetable oil or 2 tablespoons of salad dressing. · Limit sweets and added sugars to 5 servings or less a week. A serving is 1 tablespoon jelly or jam, ½ cup sorbet, or 1 cup of lemonade. · Eat less than 2,300 milligrams (mg) of sodium a day. If you limit your sodium to 1,500 mg a day, you can lower your blood pressure even more. Tips for success · Start small.  Do not try to make dramatic changes to your diet all at once. You might feel that you are missing out on your favorite foods and then be more likely to not follow the plan. Make small changes, and stick with them. Once those changes become habit, add a few more changes. · Try some of the following: ¨ Make it a goal to eat a fruit or vegetable at every meal and at snacks. This will make it easy to get the recommended amount of fruits and vegetables each day. ¨ Try yogurt topped with fruit and nuts for a snack or healthy dessert. ¨ Add lettuce, tomato, cucumber, and onion to sandwiches. ¨ Combine a ready-made pizza crust with low-fat mozzarella cheese and lots of vegetable toppings. Try using tomatoes, squash, spinach, broccoli, carrots, cauliflower, and onions. ¨ Have a variety of cut-up vegetables with a low-fat dip as an appetizer instead of chips and dip. ¨ Sprinkle sunflower seeds or chopped almonds over salads. Or try adding chopped walnuts or almonds to cooked vegetables. ¨ Try some vegetarian meals using beans and peas. Add garbanzo or kidney beans to salads. Make burritos and tacos with mashed nunes beans or black beans. Where can you learn more? Go to http://sol-hardik.info/. Enter M137 in the search box to learn more about \"DASH Diet: Care Instructions. \" Current as of: December 6, 2017 Content Version: 11.7 © 5273-1453 [employer]. Care instructions adapted under license by [employer] (which disclaims liability or warranty for this information). If you have questions about a medical condition or this instruction, always ask your healthcare professional. [place] [address] any warranty or liability for your use of this information. Introducing [facility]! Dear Thiago Thornton: Thank you for requesting a PASSNFLY account. Our records indicate that you already have an active PASSNFLY account. You can access your account anytime at [email or web address] Mail. [employer]/Secured Mail Did you know that you can access your hospital and ER discharge instructions at any time in SimpleLegal? You can also review all of your test results from your hospital stay or ER visit. Additional Information If you have questions, please visit the Frequently Asked Questions section of the Simple[employer] website at [email or web address] Remember, SimpleLegal is NOT to be used for urgent needs. For medical emergencies, dial 911. Now available from your iPhone and Android! Please provide this summary of care documentation to your next provider. Your primary care clinician is listed as Cindi Hurley. If you have any questions after today's visit, please call [phone].

## 2018-08-09 NOTE — PROGRESS NOTES
HISTORY OF PRESENT ILLNESS  Alba Gordon is a 60 y.o. female here for follow up. She has lost her nephew yesterday. She is quite upset. Has hypertension, on medications. Has asthma on inhaler. Doing well. She is seen for diabetes. He reports medication compliance: compliant most of the time. Diabetic diet compliance: compliant most of the time. Home glucose monitoring: is not performed. Further diabetic ROS: no polyuria or polydipsia, no chest pain, dyspnea or TIA's, no numbness, tingling or pain in extremities, no hypoglycemia. Lab review: labs reviewed and discussed with patient. Eye exam: up to date. Labs reviewed. Need DEXA scan, also need pneumonia shot. Diabetes   Pertinent negatives include no chest pain. Hypertension    Pertinent negatives include no chest pain, no palpitations, no blurred vision and no nausea. Cholesterol Problem   Pertinent negatives include no chest pain. Asthma   Pertinent negatives include no chest pain. Review of Systems   Constitutional: Negative. Negative for chills and fever. HENT: Negative. Eyes: Negative. Negative for blurred vision and double vision. Respiratory: Negative. Cardiovascular: Negative. Negative for chest pain and palpitations. Gastrointestinal: Negative. Negative for heartburn and nausea. Genitourinary: Negative. Negative for dysuria and urgency. Musculoskeletal: Negative. Negative for myalgias. Skin: Negative. Negative for itching and rash. Psychiatric/Behavioral: Negative. Physical Exam   Constitutional: She appears well-developed and well-nourished. No distress. HENT:        Neck: Normal range of motion. Neck supple. No JVD present. No thyromegaly present. Cardiovascular: Normal rate, regular rhythm, normal heart sounds and intact distal pulses. Pulmonary/Chest: Effort normal and breath sounds normal. No respiratory distress. She has no wheezes.    Musculoskeletal: She exhibits no edema or tenderness. Psychiatric: She has a normal mood and affect. Her behavior is normal.   anxious       ASSESSMENT and PLAN    Diagnoses and all orders for this visit:    1. Essential hypertension  Stable on current regimen. Will continue same.    -     METABOLIC PANEL, COMPREHENSIVE    2. Type 2 diabetes mellitus without complication, without long-term current use of insulin (HCC)    Stable. Will check,  -     METABOLIC PANEL, COMPREHENSIVE  -     HEMOGLOBIN A1C WITH EAG    3. Mixed hyperlipidemia    LDL was elevated. Will repeat,  -     METABOLIC PANEL, COMPREHENSIVE  -     LIPID PANEL    4. Menopause  Will order,    -     DEXA BONE DENSITY STUDY AXIAL; Future    5. Encounter for immunization  We will give,    -     PNEUMOCOCCAL CONJ VACCINE 13 VALENT IM        Discussed expected course/resolution/complications of diagnosis in detail with patient. Medication risks/benefits/costs/interactions/alternatives discussed with patient. Pt was given an after visit summary which includes diagnoses, current medications & vitals. Pt expressed understanding with the diagnosis and plan.

## 2018-08-09 NOTE — PATIENT INSTRUCTIONS

## 2018-08-09 NOTE — PROGRESS NOTES
Patient identified with 2 ID's, Name and Kiesha Dennison is a 60 y.o. female  Chief Complaint   Patient presents with    Diabetes     4 month follow up appointment       1. Have you been to the ER, urgent care clinic since your last visit? Hospitalized since your last visit? No     2. Have you seen or consulted any other health care providers outside of the [facility] since your last visit? Include any pap smears or colon screening. No       In the event something were to happen to you and you were unable to speak on your behalf, do you have an Advance Directive/ Living Will in place stating your wishes? No     If no, would you like information? Patient already has info at home       Visit Vitals    /76 (BP 1 Location: Right arm, BP Patient Position: Sitting)    Pulse 93    Temp 98.2 °F (36.8 °C) (Oral)    Resp 16    Ht 5' 4\" (1.626 m)    Wt 175 lb (79.4 kg)    SpO2 97%    BMI 30.04 kg/m2         Medication Reconciliation reviewed with patient on this date      Fall Risk Assessment, last 12 mths 8/9/2018   Able to walk? Yes   Fall in past 12 months? No       PHQ over the last two weeks 4/12/2018   Little interest or pleasure in doing things Not at all   Feeling down, depressed, irritable, or hopeless Not at all   Total Score PHQ 2 0       Learning Assessment 4/2/2015   PRIMARY LEARNER Patient   HIGHEST LEVEL OF EDUCATION - PRIMARY LEARNER  -   BARRIERS PRIMARY LEARNER -   CO-LEARNER CAREGIVER -   PRIMARY LANGUAGE ENGLISH   LEARNER PREFERENCE PRIMARY LISTENING   ANSWERED BY patient   RELATIONSHIP SELF       Abuse Screening Questionnaire 1/3/2018   Do you ever feel afraid of your partner? N   Are you in a relationship with someone who physically or mentally threatens you? N   Is it safe for you to go home?  Suad Alcantara

## 2018-08-10 LAB
ALBUMIN SERPL-MCNC: 4.5 G/DL (ref 3.6–4.8)
ALBUMIN/GLOB SERPL: 1.7 {RATIO} (ref 1.2–2.2)
ALP SERPL-CCNC: 40 IU/L (ref 39–117)
ALT SERPL-CCNC: 16 IU/L (ref 0–32)
AST SERPL-CCNC: 17 IU/L (ref 0–40)
BILIRUB SERPL-MCNC: 0.3 MG/DL (ref 0–1.2)
BUN SERPL-MCNC: 17 MG/DL (ref 8–27)
BUN/CREAT SERPL: 17 (ref 12–28)
CALCIUM SERPL-MCNC: 9.4 MG/DL (ref 8.7–10.3)
CHLORIDE SERPL-SCNC: 103 MMOL/L (ref 96–106)
CHOLEST SERPL-MCNC: 208 MG/DL (ref 100–199)
CO2 SERPL-SCNC: 23 MMOL/L (ref 20–29)
CREAT SERPL-MCNC: 0.99 MG/DL (ref 0.57–1)
EST. AVERAGE GLUCOSE BLD GHB EST-MCNC: 157 MG/DL
GLOBULIN SER CALC-MCNC: 2.7 G/DL (ref 1.5–4.5)
GLUCOSE SERPL-MCNC: 185 MG/DL (ref 65–99)
HBA1C MFR BLD: 7.1 % (ref 4.8–5.6)
HDLC SERPL-MCNC: 57 MG/DL
INTERPRETATION, 910389: NORMAL
LDLC SERPL CALC-MCNC: 135 MG/DL (ref 0–99)
Lab: NORMAL
POTASSIUM SERPL-SCNC: 4.2 MMOL/L (ref 3.5–5.2)
PROT SERPL-MCNC: 7.2 G/DL (ref 6–8.5)
SODIUM SERPL-SCNC: 142 MMOL/L (ref 134–144)
TRIGL SERPL-MCNC: 80 MG/DL (ref 0–149)
VLDLC SERPL CALC-MCNC: 16 MG/DL (ref 5–40)

## 2018-08-14 NOTE — PROGRESS NOTES
LDL slightly improved, A1c slightly worse. Be on ADA diet and low-cholesterol diet. All of the labs are stable.

## 2018-08-29 ENCOUNTER — HOSPITAL ENCOUNTER (OUTPATIENT)
Dept: MAMMOGRAPHY | Age: 63
Discharge: HOME | End: 2018-08-29
Attending: INTERNAL MEDICINE
Payer: COMMERCIAL

## 2018-08-29 DIAGNOSIS — Z78.0 MENOPAUSE: ICD-10-CM

## 2018-08-29 PROCEDURE — 77080 DXA BONE DENSITY AXIAL: CPT

## 2018-09-18 DIAGNOSIS — E11.9 TYPE 2 DIABETES MELLITUS WITHOUT COMPLICATION, WITHOUT LONG-TERM CURRENT USE OF INSULIN (HCC): ICD-10-CM

## 2018-09-18 RX ORDER — METFORMIN HYDROCHLORIDE 500 MG/1
TABLET ORAL
Qty: 180 TAB | Refills: 0 | Status: SHIPPED | OUTPATIENT
Start: 2018-09-18 | End: 2018-12-06 | Stop reason: SDUPTHER

## 2018-12-06 ENCOUNTER — OFFICE VISIT (OUTPATIENT)
Dept: INTERNAL MEDICINE CLINIC | Age: 63
End: 2018-12-06

## 2018-12-06 VITALS
OXYGEN SATURATION: 97 % | SYSTOLIC BLOOD PRESSURE: 149 MMHG | BODY MASS INDEX: 29.43 KG/M2 | HEIGHT: 64 IN | RESPIRATION RATE: 15 BRPM | WEIGHT: 172.4 LBS | TEMPERATURE: 97.1 F | HEART RATE: 100 BPM | DIASTOLIC BLOOD PRESSURE: 83 MMHG

## 2018-12-06 DIAGNOSIS — I10 ESSENTIAL HYPERTENSION: ICD-10-CM

## 2018-12-06 DIAGNOSIS — F43.9 STRESS: ICD-10-CM

## 2018-12-06 DIAGNOSIS — R00.0 TACHYCARDIA: Primary | ICD-10-CM

## 2018-12-06 DIAGNOSIS — Z12.39 SCREENING BREAST EXAMINATION: ICD-10-CM

## 2018-12-06 DIAGNOSIS — E78.2 MIXED HYPERLIPIDEMIA: ICD-10-CM

## 2018-12-06 DIAGNOSIS — E11.9 TYPE 2 DIABETES MELLITUS WITHOUT COMPLICATION, WITHOUT LONG-TERM CURRENT USE OF INSULIN (HCC): ICD-10-CM

## 2018-12-06 RX ORDER — ATENOLOL 25 MG/1
25 TABLET ORAL DAILY
Qty: 90 TAB | Refills: 1 | Status: SHIPPED | OUTPATIENT
Start: 2018-12-06 | End: 2019-01-16 | Stop reason: SDUPTHER

## 2018-12-06 RX ORDER — ATENOLOL 25 MG/1
25 TABLET ORAL DAILY
Qty: 30 TAB | Refills: 2 | Status: SHIPPED | OUTPATIENT
Start: 2018-12-06 | End: 2018-12-06 | Stop reason: SDUPTHER

## 2018-12-06 NOTE — PROGRESS NOTES
HISTORY OF PRESENT ILLNESS Isabella Ashley is a 58 y.o. female here for follow up. She is stressed and anxious. 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 She is very happy about it. Looks anxious, found to have palpitations and elevated heart rate. No chest pain ,no shortness of breath. Has asthma on inhaler. Doing well. She is seen for diabetes. He reports medication compliance: compliant most of the time. Diabetic diet compliance: compliant most of the time. Home glucose monitoring: is not performed. Further diabetic ROS: no polyuria or polydipsia, no chest pain, dyspnea or TIA's, no numbness, tingling or pain in extremities, no hypoglycemia. Lab review: labs reviewed and discussed with patient. Eye exam: up to date. Labs reviewed. Had mammogram done recently. Were normal. 
Diabetes Pertinent negatives include no chest pain. Hypertension Associated symptoms include palpitations. Pertinent negatives include no chest pain, no blurred vision and no nausea. Cholesterol Problem Pertinent negatives include no chest pain. Review of Systems Constitutional: Negative. Negative for chills and fever. HENT: Negative. Eyes: Negative. Negative for blurred vision and double vision. Respiratory: Negative. Cardiovascular: Positive for palpitations. Negative for chest pain. Gastrointestinal: Negative. Negative for heartburn and nausea. Genitourinary: Negative. Negative for dysuria and urgency. Musculoskeletal: Negative. Negative for myalgias. Skin: Negative. Negative for itching and rash. Neurological: Negative. Endo/Heme/Allergies: Negative. Psychiatric/Behavioral: The patient is nervous/anxious. Physical Exam  
Constitutional: She appears well-developed and well-nourished. No distress. HENT:  
 
  
Neck: Normal range of motion. Neck supple. No JVD present. No thyromegaly present. Cardiovascular: Regular rhythm, normal heart sounds and intact distal pulses. Tachycardia present. Pulmonary/Chest: Effort normal and breath sounds normal. No respiratory distress. She has no wheezes. Musculoskeletal: She exhibits no edema or tenderness. Psychiatric: She has a normal mood and affect. Her behavior is normal.  
Anxious,mildly depressed. ASSESSMENT and PLAN Diagnoses and all orders for this visit: 
 
1. Tachycardia Her stress level remains high. Heart rate has been elevated for last 3 times. Will start, 
-     atenolol (TENORMIN) 25 mg tablet; Take 1 Tab by mouth daily. Advised to monitor heart rate and blood pressure. Follow-up in 6 weeks. 2. Type 2 diabetes mellitus without complication, without long-term current use of insulin ([address] Stable A1c. On medications. Will check, 
-     METABOLIC PANEL, COMPREHENSIVE 
-     HEMOGLOBIN A1C WITH EAG 
-     MICROALBUMIN, UR, RAND W/ MICROALB/CREAT RATIO 3. Essential hypertension Taking Prinzide, will add Tenormin. Will check, 
-     METABOLIC PANEL, COMPREHENSIVE 
-     atenolol (TENORMIN) 25 mg tablet; Take 1 Tab by mouth daily. 4. Mixed hyperlipidemia Stable. 5. Screening breast examination Had mammogram done recently. She will obtain record. 6.  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 She is very happy about it. She feels she is depressed and stressed. Will start her on Tenormin. Will monitor her for now. Follow-up in 6 weeks to decide if she needs to be on medications. Discussed expected course/resolution/complications of diagnosis in detail with patient. Medication risks/benefits/costs/interactions/alternatives discussed with patient. Pt was given an after visit summary which includes diagnoses, current medications & vitals. Pt expressed understanding with the diagnosis and plan.

## 2018-12-06 NOTE — PROGRESS NOTES
Health Maintenance Due Topic Date Due  Pneumococcal 19-64 Medium Risk (1 of 1 - PPSV23) 08/19/1973  
 DTaP/Tdap/Td series (1 - Tdap) 08/19/1975  Shingrix Vaccine Age 50> (1 of 2) 08/19/2004  PAP AKA CERVICAL CYTOLOGY  06/26/2016  
 EYE EXAM RETINAL OR DILATED  03/17/2017  BREAST CANCER SCRN MAMMOGRAM  12/28/2017  Influenza Age 5 to Adult  08/01/2018 Chief Complaint Patient presents with  Hypertension  Cholesterol Problem  Diabetes  
  has been having problems with blood sugar 1. Have you been to the ER, urgent care clinic since your last visit? Hospitalized since your last visit? No 
 
2. Have you seen or consulted any other health care providers outside of the [address] [place] [address] since your last visit? Include any pap smears or colon screening. No 
 
3) Do you have an Advance Directive on file? no 
 
4) Are you interested in receiving information on Advance Directives? NO Patient is accompanied by self I have received verbal consent from Dequan Martinez to discuss any/all medical information while they are present in the room.

## 2018-12-07 LAB
ALBUMIN SERPL-MCNC: 4.7 G/DL (ref 3.6–4.8)
ALBUMIN/CREAT UR: 4.7 MG/G CREAT (ref 0–30)
ALBUMIN/GLOB SERPL: 1.6 {RATIO} (ref 1.2–2.2)
ALP SERPL-CCNC: 46 IU/L (ref 39–117)
ALT SERPL-CCNC: 15 IU/L (ref 0–32)
AST SERPL-CCNC: 17 IU/L (ref 0–40)
BILIRUB SERPL-MCNC: 0.3 MG/DL (ref 0–1.2)
BUN SERPL-MCNC: 19 MG/DL (ref 8–27)
BUN/CREAT SERPL: 15 (ref 12–28)
CALCIUM SERPL-MCNC: 9.8 MG/DL (ref 8.7–10.3)
CHLORIDE SERPL-SCNC: 104 MMOL/L (ref 96–106)
CO2 SERPL-SCNC: 23 MMOL/L (ref 20–29)
CREAT SERPL-MCNC: 1.28 MG/DL (ref 0.57–1)
CREAT UR-MCNC: 287.7 MG/DL
EST. AVERAGE GLUCOSE BLD GHB EST-MCNC: 157 MG/DL
GLOBULIN SER CALC-MCNC: 2.9 G/DL (ref 1.5–4.5)
GLUCOSE SERPL-MCNC: 164 MG/DL (ref 65–99)
HBA1C MFR BLD: 7.1 % (ref 4.8–5.6)
INTERPRETATION: NORMAL
Lab: NORMAL
MICROALBUMIN UR-MCNC: 13.4 UG/ML
POTASSIUM SERPL-SCNC: 4.5 MMOL/L (ref 3.5–5.2)
PROT SERPL-MCNC: 7.6 G/DL (ref 6–8.5)
SODIUM SERPL-SCNC: 145 MMOL/L (ref 134–144)

## 2018-12-12 RX ORDER — LISINOPRIL AND HYDROCHLOROTHIAZIDE 20; 25 MG/1; MG/1
TABLET ORAL
Qty: 90 TAB | Refills: 3 | Status: SHIPPED | OUTPATIENT
Start: 2018-12-12 | End: 2019-01-16 | Stop reason: SDUPTHER

## 2018-12-13 NOTE — PROGRESS NOTES
Kidney function slightly worse. Advised to monitor blood pressure and sugar. Be on low-carb diet. Please mail her diabetic renal diet. A1c is still same. Be on ADA diet. Repeat BMP in 1 month.

## 2018-12-15 DIAGNOSIS — E11.9 TYPE 2 DIABETES MELLITUS WITHOUT COMPLICATION, WITHOUT LONG-TERM CURRENT USE OF INSULIN (HCC): ICD-10-CM

## 2018-12-15 DIAGNOSIS — I10 ESSENTIAL HYPERTENSION: Primary | ICD-10-CM

## 2019-01-15 DIAGNOSIS — I10 ESSENTIAL HYPERTENSION: ICD-10-CM

## 2019-01-15 DIAGNOSIS — E11.9 TYPE 2 DIABETES MELLITUS WITHOUT COMPLICATION, WITHOUT LONG-TERM CURRENT USE OF INSULIN (HCC): ICD-10-CM

## 2019-01-16 ENCOUNTER — OFFICE VISIT (OUTPATIENT)
Dept: INTERNAL MEDICINE CLINIC | Age: 64
End: 2019-01-16

## 2019-01-16 VITALS
DIASTOLIC BLOOD PRESSURE: 80 MMHG | OXYGEN SATURATION: 97 % | HEART RATE: 84 BPM | RESPIRATION RATE: 20 BRPM | HEIGHT: 64 IN | WEIGHT: 173.2 LBS | BODY MASS INDEX: 29.57 KG/M2 | SYSTOLIC BLOOD PRESSURE: 150 MMHG | TEMPERATURE: 97.5 F

## 2019-01-16 DIAGNOSIS — E11.9 TYPE 2 DIABETES MELLITUS WITHOUT COMPLICATION, WITHOUT LONG-TERM CURRENT USE OF INSULIN (HCC): ICD-10-CM

## 2019-01-16 DIAGNOSIS — F43.9 FEELING STRESSED OUT: ICD-10-CM

## 2019-01-16 DIAGNOSIS — I10 ESSENTIAL HYPERTENSION: ICD-10-CM

## 2019-01-16 DIAGNOSIS — R00.0 TACHYCARDIA: ICD-10-CM

## 2019-01-16 DIAGNOSIS — N28.9 RENAL INSUFFICIENCY: Primary | ICD-10-CM

## 2019-01-16 PROBLEM — E11.21 TYPE 2 DIABETES WITH NEPHROPATHY (HCC): Status: ACTIVE | Noted: 2019-01-16

## 2019-01-16 RX ORDER — ATENOLOL 25 MG/1
25 TABLET ORAL DAILY
Qty: 90 TAB | Refills: 1
Start: 2019-01-16

## 2019-01-16 NOTE — PATIENT INSTRUCTIONS
DASH Diet: Care Instructions Your Care Instructions The DASH diet is an eating plan that can help lower your blood pressure. DASH stands for Dietary Approaches to Stop Hypertension. Hypertension is high blood pressure. The DASH diet focuses on eating foods that are high in calcium, potassium, and magnesium. These nutrients can lower blood pressure. The foods that are highest in these nutrients are fruits, vegetables, low-fat dairy products, nuts, seeds, and legumes. But taking calcium, potassium, and magnesium supplements instead of eating foods that are high in those nutrients does not have the same effect. The DASH diet also includes whole grains, fish, and poultry. The DASH diet is one of several lifestyle changes your doctor may recommend to lower your high blood pressure. Your doctor may also want you to decrease the amount of sodium in your diet. Lowering sodium while following the DASH diet can lower blood pressure even further than just the DASH diet alone. Follow-up care is a key part of your treatment and safety. Be sure to make and go to all appointments, and call your doctor if you are having problems. It's also a good idea to know your test results and keep a list of the medicines you take. How can you care for yourself at home? Following the DASH diet · Eat 4 to 5 servings of fruit each day. A serving is 1 medium-sized piece of fruit, ½ cup chopped or canned fruit, 1/4 cup dried fruit, or 4 ounces (½ cup) of fruit juice. Choose fruit more often than fruit juice. · Eat 4 to 5 servings of vegetables each day. A serving is 1 cup of lettuce or raw leafy vegetables, ½ cup of chopped or cooked vegetables, or 4 ounces (½ cup) of vegetable juice. Choose vegetables more often than vegetable juice. · Get 2 to 3 servings of low-fat and fat-free dairy each day. A serving is 8 ounces of milk, 1 cup of yogurt, or 1 ½ ounces of cheese. · Eat 6 to 8 servings of grains each day. A serving is 1 slice of bread, 1 ounce of dry cereal, or ½ cup of cooked rice, pasta, or cooked cereal. Try to choose whole-grain products as much as possible. · Limit lean meat, poultry, and fish to 2 servings each day. A serving is 3 ounces, about the size of a deck of cards. · Eat 4 to 5 servings of nuts, seeds, and legumes (cooked dried beans, lentils, and split peas) each week. A serving is 1/3 cup of nuts, 2 tablespoons of seeds, or ½ cup of cooked beans or peas. · Limit fats and oils to 2 to 3 servings each day. A serving is 1 teaspoon of vegetable oil or 2 tablespoons of salad dressing. · Limit sweets and added sugars to 5 servings or less a week. A serving is 1 tablespoon jelly or jam, ½ cup sorbet, or 1 cup of lemonade. · Eat less than 2,300 milligrams (mg) of sodium a day. If you limit your sodium to 1,500 mg a day, you can lower your blood pressure even more. Tips for success · Start small. Do not try to make dramatic changes to your diet all at once. You might feel that you are missing out on your favorite foods and then be more likely to not follow the plan. Make small changes, and stick with them. Once those changes become habit, add a few more changes. · Try some of the following: ? Make it a goal to eat a fruit or vegetable at every meal and at snacks. This will make it easy to get the recommended amount of fruits and vegetables each day. ? Try yogurt topped with fruit and nuts for a snack or healthy dessert. ? Add lettuce, tomato, cucumber, and onion to sandwiches. ? Combine a ready-made pizza crust with low-fat mozzarella cheese and lots of vegetable toppings. Try using tomatoes, squash, spinach, broccoli, carrots, cauliflower, and onions. ? Have a variety of cut-up vegetables with a low-fat dip as an appetizer instead of chips and dip. ? Sprinkle sunflower seeds or chopped almonds over salads.  Or try adding chopped walnuts or almonds to cooked vegetables. ? Try some vegetarian meals using beans and peas. Add garbanzo or kidney beans to salads. Make burritos and tacos with mashed nunes beans or black beans. Where can you learn more? Go to http://sol-hardik.info/. Enter D851 in the search box to learn more about \"DASH Diet: Care Instructions. \" Current as of: December 6, 2017 Content Version: 11.8 © 3540-4127 [employer]. Care instructions adapted under license by [employer] (which disclaims liability or warranty for this information). If you have questions about a medical condition or this instruction, always ask your healthcare professional. [address] any warranty or liability for your use of this information.

## 2019-01-16 NOTE — PROGRESS NOTES
HISTORY OF PRESENT ILLNESS Kae Riedel is a 58 y.o. female here for follow up. She will start her atenolol. Still stressed and anxious. Chester Werner about her lab work and kidney function. Compliant with Prinzide. Did not get a new job. Remains stressed and anxious. Has asthma on inhal 
Has diabetes, on metformin. We will not change medications yet. Hypertension Associated symptoms include palpitations. Pertinent negatives include no chest pain, no blurred vision and no nausea. Cholesterol Problem Pertinent negatives include no chest pain. Diabetes Pertinent negatives include no chest pain. Review of Systems Constitutional: Negative. Negative for chills and fever. HENT: Negative. Eyes: Negative. Negative for blurred vision and double vision. Respiratory: Negative. Cardiovascular: Positive for palpitations. Negative for chest pain. Gastrointestinal: Negative. Negative for heartburn and nausea. Genitourinary: Negative. Negative for dysuria and urgency. Musculoskeletal: Negative. Negative for myalgias. Skin: Negative. Negative for itching and rash. Neurological: Negative. Endo/Heme/Allergies: Negative. Psychiatric/Behavioral: The patient is nervous/anxious. Physical Exam  
Constitutional: She appears well-developed and well-nourished. No distress. HENT:  
 
  
Neck: Normal range of motion. Neck supple. No JVD present. No thyromegaly present. Cardiovascular: Regular rhythm, normal heart sounds and intact distal pulses. Tachycardia present. Pulmonary/Chest: Effort normal and breath sounds normal. No respiratory distress. She has no wheezes. Musculoskeletal: She exhibits no edema or tenderness. Psychiatric: She has a normal mood and affect. Her behavior is normal.  
Anxious,stressed ASSESSMENT and PLAN Diagnoses and all orders for this visit: 1. Renal insufficiency Creatinine 1.25. Advised her to take both blood pressure medications atenolol and Prinzide. Will monitor BMP after 1 month. Be on low protein diet. On metformin. Will monitor BMP and decide if I need to reduce the dosage of metformin. 2. Tachycardia From stress and anxiety. She did not start her atenolol. Advised her to start on atenolol. 
-     atenolol (TENORMIN) 25 mg tablet; Take 1 Tab by mouth daily. Monitor pulse and blood pressure. 3. Essential hypertension Already on Prinzide. Blood pressure still elevated from stress. Advised to start back on atenolol. 
-     atenolol (TENORMIN) 25 mg tablet; Take 1 Tab by mouth daily. 4. Feeling stressed out Need to start yoga and meditation. 5.  Type 2 diabetes mellitus On metformin, will monitor BMP for now. Discussed expected course/resolution/complications of diagnosis in detail with patient. Medication risks/benefits/costs/interactions/alternatives discussed with patient. Pt was given an after visit summary which includes diagnoses, current medications & vitals. Pt expressed understanding with the diagnosis and plan.

## 2019-01-16 NOTE — PROGRESS NOTES
Health Maintenance Due Topic Date Due  Pneumococcal 19-64 Medium Risk (1 of 1 - PPSV23) 08/19/1973  
 DTaP/Tdap/Td series (1 - Tdap) 08/19/1975  Shingrix Vaccine Age 50> (1 of 2) 08/19/2004  PAP AKA CERVICAL CYTOLOGY  06/26/2016  
 EYE EXAM RETINAL OR DILATED  03/17/2017  BREAST CANCER SCRN MAMMOGRAM  12/28/2017 Chief Complaint Patient presents with  Hypertension Routine Care  Diabetes 1. Have you been to the ER, urgent care clinic since your last visit? Hospitalized since your last visit? No 
 
2. Have you seen or consulted any other health care providers outside of the [address] [place] [address] since your last visit? Include any pap smears or colon screening. No 
 
3) Do you have an Advance Directive on file? no 
 
4) Are you interested in receiving information on Advance Directives? NO Patient is accompanied by self I have received verbal consent from Lynda Altamirano to discuss any/all medical information while they are present in the room.

## 2020-11-13 ENCOUNTER — HOSPITAL ENCOUNTER (EMERGENCY)
Age: 65
Discharge: HOME | End: 2020-11-13
Attending: EMERGENCY MEDICINE | Admitting: EMERGENCY MEDICINE
Payer: MEDICARE

## 2020-11-13 ENCOUNTER — APPOINTMENT (OUTPATIENT)
Dept: GENERAL RADIOLOGY | Age: 65
End: 2020-11-13
Attending: NURSE PRACTITIONER
Payer: MEDICARE

## 2020-11-13 VITALS
HEART RATE: 82 BPM | HEIGHT: 65 IN | RESPIRATION RATE: 18 BRPM | TEMPERATURE: 97.3 F | OXYGEN SATURATION: 98 % | SYSTOLIC BLOOD PRESSURE: 189 MMHG | WEIGHT: 160 LBS | DIASTOLIC BLOOD PRESSURE: 93 MMHG | BODY MASS INDEX: 26.66 KG/M2

## 2020-11-13 DIAGNOSIS — R07.89 ATYPICAL CHEST PAIN: Primary | ICD-10-CM

## 2020-11-13 LAB
ALBUMIN SERPL-MCNC: 4.5 G/DL (ref 3.5–5)
ALBUMIN/GLOB SERPL: 1 {RATIO} (ref 1.1–2.2)
ALP SERPL-CCNC: 61 U/L (ref 45–117)
ALT SERPL-CCNC: 20 U/L (ref 12–78)
ANION GAP SERPL CALC-SCNC: 7 MMOL/L (ref 5–15)
AST SERPL-CCNC: 11 U/L (ref 15–37)
BASOPHILS # BLD: 0 K/UL (ref 0–0.1)
BASOPHILS NFR BLD: 1 % (ref 0–1)
BILIRUB SERPL-MCNC: 0.3 MG/DL (ref 0.2–1)
BUN SERPL-MCNC: 22 MG/DL (ref 6–20)
BUN/CREAT SERPL: 21 (ref 12–20)
CALCIUM SERPL-MCNC: 9.6 MG/DL (ref 8.5–10.1)
CHLORIDE SERPL-SCNC: 104 MMOL/L (ref 97–108)
CO2 SERPL-SCNC: 27 MMOL/L (ref 21–32)
COMMENT, HOLDF: NORMAL
CREAT SERPL-MCNC: 1.06 MG/DL (ref 0.55–1.02)
DIFFERENTIAL METHOD BLD: NORMAL
EOSINOPHIL # BLD: 0.2 K/UL (ref 0–0.4)
EOSINOPHIL NFR BLD: 4 % (ref 0–7)
ERYTHROCYTE [DISTWIDTH] IN BLOOD BY AUTOMATED COUNT: 13.1 % (ref 11.5–14.5)
GLOBULIN SER CALC-MCNC: 4.3 G/DL (ref 2–4)
GLUCOSE SERPL-MCNC: 100 MG/DL (ref 65–100)
HCT VFR BLD AUTO: 41.7 % (ref 35–47)
HGB BLD-MCNC: 14 G/DL (ref 11.5–16)
IMM GRANULOCYTES # BLD AUTO: 0 K/UL (ref 0–0.04)
IMM GRANULOCYTES NFR BLD AUTO: 0 % (ref 0–0.5)
LYMPHOCYTES # BLD: 2.2 K/UL (ref 0.8–3.5)
LYMPHOCYTES NFR BLD: 39 % (ref 12–49)
MCH RBC QN AUTO: 29 PG (ref 26–34)
MCHC RBC AUTO-ENTMCNC: 33.6 G/DL (ref 30–36.5)
MCV RBC AUTO: 86.5 FL (ref 80–99)
MONOCYTES # BLD: 0.3 K/UL (ref 0–1)
MONOCYTES NFR BLD: 6 % (ref 5–13)
NEUTS SEG # BLD: 2.9 K/UL (ref 1.8–8)
NEUTS SEG NFR BLD: 50 % (ref 32–75)
NRBC # BLD: 0 K/UL (ref 0–0.01)
NRBC BLD-RTO: 0 PER 100 WBC
PLATELET # BLD AUTO: 270 K/UL (ref 150–400)
PMV BLD AUTO: 9.9 FL (ref 8.9–12.9)
POTASSIUM SERPL-SCNC: 3.8 MMOL/L (ref 3.5–5.1)
PROT SERPL-MCNC: 8.8 G/DL (ref 6.4–8.2)
RBC # BLD AUTO: 4.82 M/UL (ref 3.8–5.2)
SAMPLES BEING HELD,HOLD: NORMAL
SODIUM SERPL-SCNC: 138 MMOL/L (ref 136–145)
TROPONIN I SERPL-MCNC: <0.05 NG/ML
TROPONIN I SERPL-MCNC: <0.05 NG/ML
WBC # BLD AUTO: 5.7 K/UL (ref 3.6–11)

## 2020-11-13 PROCEDURE — 36415 COLL VENOUS BLD VENIPUNCTURE: CPT

## 2020-11-13 PROCEDURE — 71046 X-RAY EXAM CHEST 2 VIEWS: CPT

## 2020-11-13 PROCEDURE — 99283 EMERGENCY DEPT VISIT LOW MDM: CPT

## 2020-11-13 PROCEDURE — 93005 ELECTROCARDIOGRAM TRACING: CPT

## 2020-11-13 PROCEDURE — 85025 COMPLETE CBC W/AUTO DIFF WBC: CPT

## 2020-11-13 PROCEDURE — 80053 COMPREHEN METABOLIC PANEL: CPT

## 2020-11-13 PROCEDURE — 84484 ASSAY OF TROPONIN QUANT: CPT

## 2020-11-13 NOTE — ED PROVIDER NOTES
This is a very pleasant well-appearing 58-year-old female with past medical history of well-controlled asthma, hypertension, hyperlipidemia, and type 2 diabetes who presents the ER today for evaluation of chest pain. According the patient, she has been experiencing intermittent chest pain since October of this year. She describes the pain as originating around her posterior neck spreading down her shoulders and then coming around the front of her chest and up towards the front of her neck (in a square formation). The pain seems to be aggravated by certain changes of position and alleviated quickly with rest and changing her posture. She reports first being evaluated for the symptoms last month, in which she was found to have a hemoglobin A1c of greater than 14 and a very high blood pressure. At that time, she was started on several new antihypertensives and diabetic medications, which she seemed to help her symptoms to lessen. However, today around 1:30 PM as the patient was taking a walk she reported a recurrence of her chest pain, but seemed surprised because it is never been associated with physical exertion before. The pain lasted less than 2 minutes, and was moderate intensity in nature (has difficulty articulating descriptive features of the pain) and seemed to subside with rest.  Patient denies experiencing any sensation of shortness of breath/dyspnea, lightheadedness/dizziness, near syncope, palpitations, excessive fatigue, nausea/vomiting, or diaphoresis. She presents now asymptomatic, but concerned about the new symptoms that she experienced. Patient states that she has never been evaluated by cardiology group.              Past Medical History:   Diagnosis Date    Allergic rhinitis 10/24/2009    Asthma, mild intermittent, well-controlled 10/24/2009    HTN (hypertension) 10/24/2009    Mixed hyperlipidemia 10/24/2009    Mixed hyperlipidemia 10/24/2009    Plantar fasciitis 4/4/2011    Type II or unspecified type diabetes mellitus without mention of complication, not stated as uncontrolled 10/24/2009       Past Surgical History:   Procedure Laterality Date    HX TUBAL LIGATION           Family History:   Problem Relation Age of Onset    Diabetes Mother     Diabetes Sister     Hypertension Sister     Cancer Neg Hx     Heart Disease Neg Hx     Stroke Neg Hx        Social History     Socioeconomic History    Marital status:      Spouse name: Not on file    Number of children: Not on file    Years of education: Not on file    Highest education level: Not on file   Occupational History    Not on file   Social Needs    Financial resource strain: Not on file    Food insecurity     Worry: Not on file     Inability: Not on file    Transportation needs     Medical: Not on file     Non-medical: Not on file   Tobacco Use    Smoking status: Former Smoker     Packs/day: 1.50     Types: Cigarettes     Last attempt to quit: 3/5/2008     Years since quittin.7    Smokeless tobacco: Never Used   Substance and Sexual Activity    Alcohol use: No     Alcohol/week: 0.0 standard drinks    Drug use: No    Sexual activity: Yes     Partners: Male     Birth control/protection: None     Comment:  has 3 children   Lifestyle    Physical activity     Days per week: Not on file     Minutes per session: Not on file    Stress: Not on file   Relationships    Social connections     Talks on phone: Not on file     Gets together: Not on file     Attends Latter day service: Not on file     Active member of club or organization: Not on file     Attends meetings of clubs or organizations: Not on file     Relationship status: Not on file    Intimate partner violence     Fear of current or ex partner: Not on file     Emotionally abused: Not on file     Physically abused: Not on file     Forced sexual activity: Not on file   Other Topics Concern    Not on file   Social History Narrative    Not on file ALLERGIES: Seafood    Review of Systems   Constitutional: Negative for fever. HENT: Negative for sore throat. Eyes: Negative for visual disturbance. Respiratory: Negative for shortness of breath. Cardiovascular: Positive for chest pain. Negative for palpitations and leg swelling. Gastrointestinal: Negative for vomiting. Genitourinary: Negative for dysuria. Musculoskeletal: Negative for myalgias. Skin: Negative for rash. Neurological: Negative for dizziness. Psychiatric/Behavioral: Negative for dysphoric mood. Vitals:    11/13/20 1648   BP: (!) 189/93   Pulse: 82   Resp: 18   Temp: 97.3 °F (36.3 °C)   SpO2: 98%   Weight: 72.6 kg (160 lb)   Height: 5' 5\" (1.651 m)            Physical Exam  Vitals signs and nursing note reviewed. Constitutional:       General: She is not in acute distress. Appearance: Normal appearance. She is well-developed. She is not ill-appearing. HENT:      Head: Normocephalic and atraumatic. Right Ear: External ear normal.      Left Ear: External ear normal.      Nose: Nose normal.      Mouth/Throat:      Mouth: Mucous membranes are moist.      Pharynx: Oropharynx is clear. Eyes:      General: No scleral icterus. Extraocular Movements: Extraocular movements intact. Conjunctiva/sclera: Conjunctivae normal.      Pupils: Pupils are equal, round, and reactive to light. Neck:      Musculoskeletal: Normal range of motion and neck supple. No neck rigidity or muscular tenderness. Vascular: No hepatojugular reflux or JVD. Cardiovascular:      Rate and Rhythm: Normal rate and regular rhythm. Chest Wall: PMI is not displaced. Pulses: Normal pulses. Radial pulses are 2+ on the right side and 2+ on the left side. Heart sounds: Murmur present. Pulmonary:      Effort: Pulmonary effort is normal. No tachypnea or respiratory distress. Breath sounds: Normal breath sounds. No decreased breath sounds or rales. Abdominal:      General: Abdomen is flat. Bowel sounds are normal.      Palpations: Abdomen is soft. Musculoskeletal: Normal range of motion. Right lower leg: No edema. Left lower leg: No edema. Skin:     General: Skin is warm and dry. Coloration: Skin is not pale. Neurological:      General: No focal deficit present. Mental Status: She is alert and oriented to person, place, and time. Psychiatric:         Mood and Affect: Mood normal.         Behavior: Behavior normal.         Thought Content: Thought content normal.         Judgment: Judgment normal.          [facility]      VITAL SIGNS:  Patient Vitals for the past 4 hrs:   Temp Pulse Resp BP SpO2   11/13/20 1648 97.3 °F (36.3 °C) 82 18 (!) 189/93 98 %         LABS:  Recent Results (from the past 12 hour(s))   EKG, 12 LEAD, INITIAL    Collection Time: 11/13/20  5:10 PM   Result Value Ref Range    Ventricular Rate 69 BPM    Atrial Rate 69 BPM    P-R Interval 182 ms    QRS Duration 88 ms    Q-T Interval 378 ms    QTC Calculation (Bezet) 405 ms    Calculated P Axis 56 degrees    Calculated R Axis -9 degrees    Calculated T Axis 38 degrees    Diagnosis       Normal sinus rhythm  Left ventricular hypertrophy  When compared with ECG of 04-DEC-2005 19:35,  Previous ECG has undetermined rhythm, needs review     SAMPLES BEING HELD    Collection Time: 11/13/20  5:20 PM   Result Value Ref Range    SAMPLES BEING HELD 1red,1blu     COMMENT        Add-on orders for these samples will be processed based on acceptable specimen integrity and analyte stability, which may vary by analyte.    METABOLIC PANEL, COMPREHENSIVE    Collection Time: 11/13/20  5:20 PM   Result Value Ref Range    Sodium 138 136 - 145 mmol/L    Potassium 3.8 3.5 - 5.1 mmol/L    Chloride 104 97 - 108 mmol/L    CO2 27 21 - 32 mmol/L    Anion gap 7 5 - 15 mmol/L    Glucose 100 65 - 100 mg/dL    BUN 22 (H) 6 - 20 MG/DL    Creatinine 1.06 (H) 0.55 - 1.02 MG/DL    BUN/Creatinine ratio 21 (H) 12 - 20      GFR est AA >60 >60 ml/min/1.73m2    GFR est non-AA 52 (L) >60 ml/min/1.73m2    Calcium 9.6 8.5 - 10.1 MG/DL    Bilirubin, total 0.3 0.2 - 1.0 MG/DL    ALT (SGPT) 20 12 - 78 U/L    AST (SGOT) 11 (L) 15 - 37 U/L    Alk. phosphatase 61 45 - 117 U/L    Protein, total 8.8 (H) 6.4 - 8.2 g/dL    Albumin 4.5 3.5 - 5.0 g/dL    Globulin 4.3 (H) 2.0 - 4.0 g/dL    A-G Ratio 1.0 (L) 1.1 - 2.2     CBC WITH AUTOMATED DIFF    Collection Time: 11/13/20  5:20 PM   Result Value Ref Range    WBC 5.7 3.6 - 11.0 K/uL    RBC 4.82 3.80 - 5.20 M/uL    HGB 14.0 11.5 - 16.0 g/dL    HCT 41.7 35.0 - 47.0 %    MCV 86.5 80.0 - 99.0 FL    MCH 29.0 26.0 - 34.0 PG    MCHC 33.6 30.0 - 36.5 g/dL    RDW 13.1 11.5 - 14.5 %    PLATELET 133 075 - 442 K/uL    MPV 9.9 8.9 - 12.9 FL    NRBC 0.0 0  WBC    ABSOLUTE NRBC 0.00 0.00 - 0.01 K/uL    NEUTROPHILS 50 32 - 75 %    LYMPHOCYTES 39 12 - 49 %    MONOCYTES 6 5 - 13 %    EOSINOPHILS 4 0 - 7 %    BASOPHILS 1 0 - 1 %    IMMATURE GRANULOCYTES 0 0.0 - 0.5 %    ABS. NEUTROPHILS 2.9 1.8 - 8.0 K/UL    ABS. LYMPHOCYTES 2.2 0.8 - 3.5 K/UL    ABS. MONOCYTES 0.3 0.0 - 1.0 K/UL    ABS. EOSINOPHILS 0.2 0.0 - 0.4 K/UL    ABS. BASOPHILS 0.0 0.0 - 0.1 K/UL    ABS. IMM.  GRANS. 0.0 0.00 - 0.04 K/UL    DF AUTOMATED     TROPONIN I    Collection Time: 11/13/20  5:20 PM   Result Value Ref Range    Troponin-I, Qt. <0.05 <0.05 ng/mL   TROPONIN I    Collection Time: 11/13/20  7:34 PM   Result Value Ref Range    Troponin-I, Qt. <0.05 <0.05 ng/mL   EKG, 12 LEAD, SUBSEQUENT    Collection Time: 11/13/20  7:39 PM   Result Value Ref Range    Ventricular Rate 78 BPM    Atrial Rate 78 BPM    P-R Interval 154 ms    QRS Duration 82 ms    Q-T Interval 370 ms    QTC Calculation (Bezet) 421 ms    Calculated P Axis 62 degrees    Calculated T Axis 57 degrees    Diagnosis       Normal sinus rhythm  When compared with ECG of 13-NOV-2020 17:10,  MANUAL COMPARISON REQUIRED, DATA IS UNCONFIRMED           IMAGING:  XR CHEST PA LAT Final Result   Impression: No acute process or change compared to the prior exam.               Medications During Visit:  Medications - No data to display      DECISION MAKING:  Maureen Pettit is a 76 y.o. female who comes in as above. Heart score = 4 (moderate risk). EKG appears nonischemic. Repeat EKG unchanged. Lab tests remarkable for acute processes. Negative troponin x2. Overall, the patient's symptoms do not seem to be cardiac in origin, but given her cardiovascular risk factors, heart murmur, and absence of formal cardiologist exam, she was referred to cardiology for further management. Patient noted to be hypertensive, although she states that she started several different blood pressure medicines within the last month, and states that her blood pressures normally run 120s over 70s at home. No adjustments made. Above results discussed and reviewed with the patient. Patient verbalized understanding of the care plan, including any changes to current outpatient medication regimen, discussed disease process, symptom control, and follow-up care. IMPRESSION:  1. Atypical chest pain        DISPOSITION:  Discharged      Discharge Medication List as of 11/13/2020  8:24 PM           Follow-up Information     Follow up With Specialties Details Why Contact Info    Sandra Nielson MD Internal Medicine  As needed [address]  [phone]      [facility] [place] [address]  Schedule an appointment as soon as possible for a visit Chest pain work up [address], [facility]  [phone]            The patient is asked to follow-up with their primary care provider in the next several days. They are to call tomorrow for an appointment. The patient is asked to return promptly for any increased concerns or worsening of symptoms.   They can return to this emergency department or any other emergency department.

## 2020-11-13 NOTE — ED TRIAGE NOTES
Pt reports intermittent CP x3 weeks. Pt reports the pain was worse today. Pt called dispatch health and they told her to come to the ED. Pt has hx of HTN, asthma, and DM.

## 2020-11-14 LAB
ATRIAL RATE: 69 BPM
ATRIAL RATE: 78 BPM
CALCULATED P AXIS, ECG09: 56 DEGREES
CALCULATED P AXIS, ECG09: 62 DEGREES
CALCULATED R AXIS, ECG10: -9 DEGREES
CALCULATED T AXIS, ECG11: 38 DEGREES
CALCULATED T AXIS, ECG11: 57 DEGREES
DIAGNOSIS, 93000: NORMAL
DIAGNOSIS, 93000: NORMAL
P-R INTERVAL, ECG05: 154 MS
P-R INTERVAL, ECG05: 182 MS
Q-T INTERVAL, ECG07: 370 MS
Q-T INTERVAL, ECG07: 378 MS
QRS DURATION, ECG06: 82 MS
QRS DURATION, ECG06: 88 MS
QTC CALCULATION (BEZET), ECG08: 405 MS
QTC CALCULATION (BEZET), ECG08: 421 MS
VENTRICULAR RATE, ECG03: 69 BPM
VENTRICULAR RATE, ECG03: 78 BPM

## 2022-03-18 PROBLEM — J45.40 MODERATE PERSISTENT ASTHMA: Status: ACTIVE | Noted: 2018-01-03

## 2022-03-19 PROBLEM — E11.21 TYPE 2 DIABETES WITH NEPHROPATHY (HCC): Status: ACTIVE | Noted: 2019-01-16

## 2022-08-03 LAB
HBV SURFACE AB SER QL: NON REACTIVE
MEV IGG SER IA-ACNC: >300 AU/ML
MUV IGG SER IA-ACNC: 89.5 AU/ML
RUBV IGG SERPL IA-ACNC: 13 INDEX
VZV IGG SER IA-ACNC: 1428 INDEX